# Patient Record
Sex: FEMALE | Race: WHITE | NOT HISPANIC OR LATINO | Employment: OTHER | URBAN - METROPOLITAN AREA
[De-identification: names, ages, dates, MRNs, and addresses within clinical notes are randomized per-mention and may not be internally consistent; named-entity substitution may affect disease eponyms.]

---

## 2018-06-01 ENCOUNTER — RECORDS - HEALTHEAST (OUTPATIENT)
Dept: LAB | Facility: HOSPITAL | Age: 63
End: 2018-06-01

## 2018-06-01 LAB
ALBUMIN SERPL-MCNC: 3.7 G/DL (ref 3.5–5)
ALP SERPL-CCNC: 108 U/L (ref 45–120)
ALT SERPL W P-5'-P-CCNC: 19 U/L (ref 0–45)
ANION GAP SERPL CALCULATED.3IONS-SCNC: 12 MMOL/L (ref 5–18)
AST SERPL W P-5'-P-CCNC: 19 U/L (ref 0–40)
BILIRUB SERPL-MCNC: 0.3 MG/DL (ref 0–1)
BUN SERPL-MCNC: 21 MG/DL (ref 8–22)
CALCIUM SERPL-MCNC: 9.6 MG/DL (ref 8.5–10.5)
CHLORIDE BLD-SCNC: 104 MMOL/L (ref 98–107)
CO2 SERPL-SCNC: 24 MMOL/L (ref 22–31)
CREAT SERPL-MCNC: 0.74 MG/DL (ref 0.6–1.1)
GFR SERPL CREATININE-BSD FRML MDRD: >60 ML/MIN/1.73M2
GLUCOSE BLD-MCNC: 108 MG/DL (ref 70–125)
POTASSIUM BLD-SCNC: 4.3 MMOL/L (ref 3.5–5)
PROT SERPL-MCNC: 8.2 G/DL (ref 6–8)
SODIUM SERPL-SCNC: 140 MMOL/L (ref 136–145)
TSH SERPL DL<=0.005 MIU/L-ACNC: 2.07 UIU/ML (ref 0.3–5)
VIT B12 SERPL-MCNC: 963 PG/ML (ref 213–816)

## 2019-06-17 ENCOUNTER — RECORDS - HEALTHEAST (OUTPATIENT)
Dept: LAB | Facility: CLINIC | Age: 64
End: 2019-06-17

## 2019-06-17 LAB
ANION GAP SERPL CALCULATED.3IONS-SCNC: 9 MMOL/L (ref 5–18)
BUN SERPL-MCNC: 22 MG/DL (ref 8–22)
CALCIUM SERPL-MCNC: 9.7 MG/DL (ref 8.5–10.5)
CHLORIDE BLD-SCNC: 107 MMOL/L (ref 98–107)
CO2 SERPL-SCNC: 24 MMOL/L (ref 22–31)
CREAT SERPL-MCNC: 0.69 MG/DL (ref 0.6–1.1)
GFR SERPL CREATININE-BSD FRML MDRD: >60 ML/MIN/1.73M2
GLUCOSE BLD-MCNC: 92 MG/DL (ref 70–125)
POTASSIUM BLD-SCNC: 4 MMOL/L (ref 3.5–5)
SODIUM SERPL-SCNC: 140 MMOL/L (ref 136–145)

## 2019-07-12 ENCOUNTER — RECORDS - HEALTHEAST (OUTPATIENT)
Dept: LAB | Facility: CLINIC | Age: 64
End: 2019-07-12

## 2019-07-12 LAB
ALBUMIN SERPL-MCNC: 4 G/DL (ref 3.5–5)
ALP SERPL-CCNC: 102 U/L (ref 45–120)
ALT SERPL W P-5'-P-CCNC: 14 U/L (ref 0–45)
ANION GAP SERPL CALCULATED.3IONS-SCNC: 11 MMOL/L (ref 5–18)
AST SERPL W P-5'-P-CCNC: 19 U/L (ref 0–40)
BILIRUB SERPL-MCNC: 0.4 MG/DL (ref 0–1)
BUN SERPL-MCNC: 26 MG/DL (ref 8–22)
CALCIUM SERPL-MCNC: 9.9 MG/DL (ref 8.5–10.5)
CHLORIDE BLD-SCNC: 107 MMOL/L (ref 98–107)
CO2 SERPL-SCNC: 27 MMOL/L (ref 22–31)
CREAT SERPL-MCNC: 0.75 MG/DL (ref 0.6–1.1)
GFR SERPL CREATININE-BSD FRML MDRD: >60 ML/MIN/1.73M2
GLUCOSE BLD-MCNC: 113 MG/DL (ref 70–125)
POTASSIUM BLD-SCNC: 3.7 MMOL/L (ref 3.5–5)
PREALB SERPL-MCNC: 15.6 MG/DL (ref 19–38)
PROT SERPL-MCNC: 7.7 G/DL (ref 6–8)
SODIUM SERPL-SCNC: 145 MMOL/L (ref 136–145)
TSH SERPL DL<=0.005 MIU/L-ACNC: 2.76 UIU/ML (ref 0.3–5)
VIT B12 SERPL-MCNC: 1001 PG/ML (ref 213–816)

## 2019-07-15 LAB — VIT B1 PYROPHOSHATE BLD-SCNC: 114 NMOL/L (ref 70–180)

## 2019-08-07 ENCOUNTER — HOSPITAL ENCOUNTER (OUTPATIENT)
Dept: MAMMOGRAPHY | Facility: CLINIC | Age: 64
Discharge: HOME | End: 2019-08-07
Attending: FAMILY MEDICINE

## 2019-08-07 DIAGNOSIS — Z12.31 VISIT FOR SCREENING MAMMOGRAM: ICD-10-CM

## 2020-04-10 ENCOUNTER — RECORDS - HEALTHEAST (OUTPATIENT)
Dept: LAB | Facility: CLINIC | Age: 65
End: 2020-04-10

## 2020-04-11 LAB — BACTERIA SPEC CULT: NO GROWTH

## 2020-07-13 ENCOUNTER — OFFICE VISIT (OUTPATIENT)
Dept: NEUROLOGY | Facility: CLINIC | Age: 65
End: 2020-07-13
Payer: COMMERCIAL

## 2020-07-13 VITALS — WEIGHT: 190 LBS | HEIGHT: 58 IN | BODY MASS INDEX: 39.88 KG/M2

## 2020-07-13 DIAGNOSIS — G30.1 LATE ONSET ALZHEIMER'S DISEASE WITH BEHAVIORAL DISTURBANCE (H): Primary | ICD-10-CM

## 2020-07-13 DIAGNOSIS — F02.818 LATE ONSET ALZHEIMER'S DISEASE WITH BEHAVIORAL DISTURBANCE (H): Primary | ICD-10-CM

## 2020-07-13 PROBLEM — F03.90 DEMENTIA (H): Status: ACTIVE | Noted: 2020-07-13

## 2020-07-13 PROBLEM — F34.1 DYSTHYMIA: Status: ACTIVE | Noted: 2020-07-13

## 2020-07-13 PROBLEM — R63.0 LOSS OF APPETITE: Status: ACTIVE | Noted: 2020-07-13

## 2020-07-13 PROBLEM — K21.9 GASTRO-ESOPHAGEAL REFLUX DISEASE WITHOUT ESOPHAGITIS: Status: ACTIVE | Noted: 2020-07-13

## 2020-07-13 PROBLEM — R35.0 INCREASED FREQUENCY OF URINATION: Status: ACTIVE | Noted: 2020-07-13

## 2020-07-13 PROBLEM — E66.01 MORBID OBESITY (H): Status: ACTIVE | Noted: 2020-07-13

## 2020-07-13 PROBLEM — R07.2 PRECORDIAL PAIN: Status: ACTIVE | Noted: 2020-07-13

## 2020-07-13 PROBLEM — M17.9 OSTEOARTHRITIS OF KNEE: Status: ACTIVE | Noted: 2020-07-13

## 2020-07-13 PROBLEM — Z79.1 LONG TERM (CURRENT) USE OF NON-STEROIDAL ANTI-INFLAMMATORIES (NSAID): Status: ACTIVE | Noted: 2020-07-13

## 2020-07-13 PROBLEM — G31.84 MILD COGNITIVE IMPAIRMENT: Status: ACTIVE | Noted: 2020-07-13

## 2020-07-13 PROBLEM — E78.5 HYPERLIPIDEMIA: Status: ACTIVE | Noted: 2020-07-13

## 2020-07-13 PROBLEM — Z79.82 LONG TERM (CURRENT) USE OF ASPIRIN: Status: ACTIVE | Noted: 2020-07-13

## 2020-07-13 PROBLEM — M46.1: Status: ACTIVE | Noted: 2020-07-13

## 2020-07-13 PROBLEM — N32.81 OVERACTIVE BLADDER: Status: ACTIVE | Noted: 2020-07-13

## 2020-07-13 PROBLEM — G47.01 INSOMNIA DUE TO MEDICAL CONDITION: Status: ACTIVE | Noted: 2020-07-13

## 2020-07-13 PROCEDURE — 99213 OFFICE O/P EST LOW 20 MIN: CPT | Mod: 95 | Performed by: PSYCHIATRY & NEUROLOGY

## 2020-07-13 RX ORDER — MEMANTINE HYDROCHLORIDE 10 MG/1
1 TABLET ORAL 2 TIMES DAILY
COMMUNITY
Start: 2020-01-12 | End: 2020-11-16

## 2020-07-13 RX ORDER — OMEGA-3/DHA/EPA/FISH OIL 60 MG-90MG
1 CAPSULE ORAL DAILY
COMMUNITY

## 2020-07-13 RX ORDER — QUETIAPINE FUMARATE 25 MG/1
1 TABLET, FILM COATED ORAL DAILY
COMMUNITY
Start: 2020-01-12 | End: 2020-07-13

## 2020-07-13 RX ORDER — DONEPEZIL HYDROCHLORIDE 10 MG/1
1 TABLET, FILM COATED ORAL AT BEDTIME
COMMUNITY
Start: 2020-01-12 | End: 2020-11-16

## 2020-07-13 RX ORDER — QUETIAPINE FUMARATE 25 MG/1
25 TABLET, FILM COATED ORAL 2 TIMES DAILY
Qty: 60 TABLET | Refills: 4 | Status: SHIPPED | OUTPATIENT
Start: 2020-07-13 | End: 2020-10-30

## 2020-07-13 RX ORDER — CELECOXIB 200 MG/1
1 CAPSULE ORAL 2 TIMES DAILY
COMMUNITY
Start: 2020-01-09

## 2020-07-13 RX ORDER — MIRTAZAPINE 30 MG/1
1 TABLET, FILM COATED ORAL AT BEDTIME
COMMUNITY
Start: 2020-01-12 | End: 2021-03-05

## 2020-07-13 RX ORDER — ASCORBIC ACID 500 MG
1 TABLET ORAL DAILY
COMMUNITY

## 2020-07-13 RX ORDER — CALCIUM CARBONATE/VITAMIN D3 500-10/5ML
1 LIQUID (ML) ORAL DAILY
COMMUNITY

## 2020-07-13 RX ORDER — ASPIRIN 81 MG/1
81 TABLET ORAL DAILY
COMMUNITY

## 2020-07-13 ASSESSMENT — MIFFLIN-ST. JEOR: SCORE: 1286.58

## 2020-07-13 NOTE — PROGRESS NOTES
[facility] Neurology  [place]    Jasmyne Madison MRN# [id number]   Age: 65 year old YOB: 1955               Assessment and Plan:   Assessment:   Dementia consistent with Alzheimer disease        Plan:     For now we will increase the quetiapine to twice a day, once in the afternoon and once before bed.  We will see if it makes a difference in terms of managing some agitation and defiant behavior.  I again encouraged the patient's  to take advantage of any assistance available.  I suggested a call the [facility]  regarding home health that is available.  With the current coronavirus pandemic, it is hard to pressure family into placing the patient in long-term care where virus transmission tends to occur rarely.    We will plan to meet again in 3 or 4 months to see how things are going.  We will continue the Aricept and Namenda for now.             Chief Complaint/HPI:     I saw this patient and her  for a video follow-up visit today.  I last saw them about 6 months ago.  Recall continues to decline.  The patient's  reports that she can typically remember something for about 30 seconds.  She repeats a lot of questions now.  She often asks where they are going today.  In fact, he purchased a remote control outlet for the garage , as the patient will sometimes open the garage door and wait in the car, anticipating going somewhere (when there are no plans to go anywhere) and she might leave the garage door open even after coming back inside.  After looking at the medication label, he has been giving her the Seroquel around bedtime.  She is up and down a little bit less until she finally goes to sleep around 10 or 1030 but clearly the Seroquel has not helped the defiant behaviors earlier in the day.  Her  is able to get out for 9 holes of golf once a week, their son comes to stay with the patient at that time, otherwise her  really provides 24-hour  care.  She continues to be defiant about personal hygiene.  They have a computer photo frame and she can look at photos sent by family members.  She does remember her own mother and close family members, but recently could not recognize her brother-in-law (whom she has known for 40 years).            Past Medical History:    has a past medical history of Dementia (H), GERD (gastroesophageal reflux disease), and Hyperlipidemia.          Past Surgical History:    has a past surgical history that includes GYN surgery and Abdomen surgery.          Social History:     Social History     Tobacco Use     Smoking status: Never Smoker     Smokeless tobacco: Never Used   Substance Use Topics     Alcohol use: Not Currently             Family History:     Family History   Problem Relation Age of Onset     Depression Mother      Suicidality Mother      Diabetes Father      Multiple Sclerosis Father      Myocardial Infarction Father                 Allergies:   No Known Allergies          Medications:     Current Outpatient Medications:      aspirin 81 MG EC tablet, Take 81 mg by mouth daily, Disp: , Rfl:      celecoxib (CELEBREX) 200 MG capsule, Take 1 capsule by mouth 2 times daily, Disp: , Rfl:      cholecalciferol (VITAMIN D3) 25 mcg (1000 units) capsule, Take 2 capsules by mouth daily, Disp: , Rfl:      docusate sodium (COLACE) 50 MG capsule, Take 50 mg by mouth 2 times daily as needed for constipation, Disp: , Rfl:      donepezil (ARICEPT) 10 MG tablet, Take 1 tablet by mouth At Bedtime, Disp: , Rfl:      magnesium oxide 400 MG CAPS, Take 1 capsule by mouth daily, Disp: , Rfl:      memantine (NAMENDA) 10 MG tablet, Take 1 tablet by mouth 2 times daily, Disp: , Rfl:      mirtazapine (REMERON) 30 MG tablet, Take 1 tablet by mouth At Bedtime, Disp: , Rfl:      Multiple Vitamins-Minerals (MULTIVITAMIN ADULT PO), Take 1 tablet by mouth daily, Disp: , Rfl:      Omega-3 Fatty Acids (FISH OIL) 500 MG CAPS, Take 1 capsule by mouth  "daily, Disp: , Rfl:      QUEtiapine (SEROQUEL) 25 MG tablet, Take 1 tablet by mouth daily 0.5 tab was not working so they are using the full tab PO Daily, Disp: , Rfl:      vitamin C (ASCORBIC ACID) 500 MG tablet, Take 1 tablet by mouth daily, Disp: , Rfl:            Review of Systems:   No difficulty breathing or swallowing, no balance difficulties            Physical Exam:   She is awake and alert, she does not participate in cognitive testing  Conversation is clear and coherent but limited.       The patient has been notified of following:     \"This video visit will be conducted via a call between you and your physician/provider. We have found that certain health care needs can be provided without the need for an in-person physical exam.  This service lets us provide the care you need with a video conversation.  If a prescription is necessary we can send it directly to your pharmacy.  If lab work is needed we can place an order for that and you can then stop by our lab to have the test done at a later time.    Video visits are billed at different rates depending on your insurance coverage.  Please reach out to your insurance provider with any questions.    If during the course of the call the physician/provider feels a video visit is not appropriate, you will not be charged for this service.\"    Patient has given verbal consent for Video visit? Yes      Video-Visit Details    Type of service:  Video Visit    Video Start Time: 10:47 AM  Video End Time: 11:08 AM    Originating Location (pt. Location): Home  Distant Location (provider location):  [facility] NEUROLOGY [place]   Platform used for Video Visit: Shaun Albrecht MD         "

## 2020-07-13 NOTE — LETTER
7/13/2020         RE: Jasmyne Madison  [address] [facility] [address]        Dear Colleague,    Thank you for referring your patient, Jasmyne Madison, to the [facility] NEUROLOGY [place]. Please see a copy of my visit note below.    [facility] Neurology  [place]    Jasmyne Madison MRN# [id number]   Age: 65 year old YOB: 1955               Assessment and Plan:   Assessment:   Dementia consistent with Alzheimer disease        Plan:     For now we will increase the quetiapine to twice a day, once in the afternoon and once before bed.  We will see if it makes a difference in terms of managing some agitation and defiant behavior.  I again encouraged the patient's  to take advantage of any assistance available.  I suggested a call the [facility]  regarding home health that is available.  With the current coronavirus pandemic, it is hard to pressure family into placing the patient in long-term care where virus transmission tends to occur rarely.    We will plan to meet again in 3 or 4 months to see how things are going.  We will continue the Aricept and Namenda for now.             Chief Complaint/HPI:     I saw this patient and her  for a video follow-up visit today.  I last saw them about 6 months ago.  Recall continues to decline.  The patient's  reports that she can typically remember something for about 30 seconds.  She repeats a lot of questions now.  She often asks where they are going today.  In fact, he purchased a remote control outlet for the garage , as the patient will sometimes open the garage door and wait in the car, anticipating going somewhere (when there are no plans to go anywhere) and she might leave the garage door open even after coming back inside.  After looking at the medication label, he has been giving her the Seroquel around bedtime.  She is up and down a little bit less until she finally goes to sleep around 10 or 1030 but  clearly the Seroquel has not helped the defiant behaviors earlier in the day.  Her  is able to get out for 9 holes of golf once a week, their son comes to stay with the patient at that time, otherwise her  really provides 24-hour care.  She continues to be defiant about personal hygiene.  They have a computer photo frame and she can look at photos sent by family members.  She does remember her own mother and close family members, but recently could not recognize her brother-in-law (whom she has known for 40 years).            Past Medical History:    has a past medical history of Dementia (H), GERD (gastroesophageal reflux disease), and Hyperlipidemia.          Past Surgical History:    has a past surgical history that includes GYN surgery and Abdomen surgery.          Social History:     Social History     Tobacco Use     Smoking status: Never Smoker     Smokeless tobacco: Never Used   Substance Use Topics     Alcohol use: Not Currently             Family History:     Family History   Problem Relation Age of Onset     Depression Mother      Suicidality Mother      Diabetes Father      Multiple Sclerosis Father      Myocardial Infarction Father                 Allergies:   No Known Allergies          Medications:     Current Outpatient Medications:      aspirin 81 MG EC tablet, Take 81 mg by mouth daily, Disp: , Rfl:      celecoxib (CELEBREX) 200 MG capsule, Take 1 capsule by mouth 2 times daily, Disp: , Rfl:      cholecalciferol (VITAMIN D3) 25 mcg (1000 units) capsule, Take 2 capsules by mouth daily, Disp: , Rfl:      docusate sodium (COLACE) 50 MG capsule, Take 50 mg by mouth 2 times daily as needed for constipation, Disp: , Rfl:      donepezil (ARICEPT) 10 MG tablet, Take 1 tablet by mouth At Bedtime, Disp: , Rfl:      magnesium oxide 400 MG CAPS, Take 1 capsule by mouth daily, Disp: , Rfl:      memantine (NAMENDA) 10 MG tablet, Take 1 tablet by mouth 2 times daily, Disp: , Rfl:      mirtazapine  "(REMERON) 30 MG tablet, Take 1 tablet by mouth At Bedtime, Disp: , Rfl:      Multiple Vitamins-Minerals (MULTIVITAMIN ADULT PO), Take 1 tablet by mouth daily, Disp: , Rfl:      Omega-3 Fatty Acids (FISH OIL) 500 MG CAPS, Take 1 capsule by mouth daily, Disp: , Rfl:      QUEtiapine (SEROQUEL) 25 MG tablet, Take 1 tablet by mouth daily 0.5 tab was not working so they are using the full tab PO Daily, Disp: , Rfl:      vitamin C (ASCORBIC ACID) 500 MG tablet, Take 1 tablet by mouth daily, Disp: , Rfl:            Review of Systems:   No difficulty breathing or swallowing, no balance difficulties            Physical Exam:   She is awake and alert, she does not participate in cognitive testing  Conversation is clear and coherent but limited.       The patient has been notified of following:     \"This video visit will be conducted via a call between you and your physician/provider. We have found that certain health care needs can be provided without the need for an in-person physical exam.  This service lets us provide the care you need with a video conversation.  If a prescription is necessary we can send it directly to your pharmacy.  If lab work is needed we can place an order for that and you can then stop by our lab to have the test done at a later time.    Video visits are billed at different rates depending on your insurance coverage.  Please reach out to your insurance provider with any questions.    If during the course of the call the physician/provider feels a video visit is not appropriate, you will not be charged for this service.\"    Patient has given verbal consent for Video visit? Yes      Video-Visit Details    Type of service:  Video Visit    Video Start Time: 10:47 AM  Video End Time: 11:08 AM    Originating Location (pt. Location): Home  Distant Location (provider location):  [facility] NEUROLOGY [place]   Platform used for Video Visit: Shaun Albrecht MD         Again, thank " you for allowing me to participate in the care of your patient.        Sincerely,        Demond Albrecht MD

## 2020-07-13 NOTE — PATIENT INSTRUCTIONS
Patient Education     Alzheimer Disease  Alzheimer disease is a brain illness that can happen usually in older adults, but it can also happen as early as age 40. It is the most common cause of dementia. It is a progressive disease. This means it gets worse over time.  What is Alzheimer disease?  Alzheimer disease causes a series of changes to nerves of the brain. Some nerves form into clumps and tangles, and lose some of their connections to other nerves.  Healthcare providers don t fully understand what causes Alzheimer disease. But they think these may be some of the causes:    Age and family history    Certain genes    Abnormal protein deposits in the brain    Environmental factors    Problems with a person s immune system    Possibly infections  Symptoms of Alzheimer disease  The disease causes changes in behavior and thinking known as dementia. The symptoms include:    Memory loss    Confusion    Restlessness    Personality and behavior changes    Problems with judgment    Problems communicating with others    Inability to follow directions    Lack of emotion  Diagnosing Alzheimer disease  No single test is able to diagnose Alzheimer disease. Instead healthcare providers use a series of tests to rule out other health conditions. The tests may include:    A complete medical history. This may include questions about overall health and past health problems. The healthcare provider may ask how well the person can do daily tasks. The healthcare provider may ask family or close friends about any changes in behavior or personality.    Mental status test. This is a test of memory, problem solving, attention, counting, and language.     Standard medical tests. These may include blood and urine tests to find possible causes for the problem.    Brain imaging tests.  CT, MRI, or positron emission tomography (PET) may be used to rule out other causes of the problem.  Treating Alzheimer disease  Alzheimer disease has no  cure. Instead healthcare providers can help ease some symptoms. This can make a person with Alzheimer more comfortable. Treatment can also make it easier for their caregivers to take care of them.  Some medicines may help slow the decline of a person s memory, thinking, and language skills. They may help with problems of behavior, such as aggression. They can lessen hallucinations and delusions. These medicines can work for some but not all people. And they may help for only a limited time. Medicines include:    Cholinesterase inhibitors    Donepezil    Galantamine    Rivastigmine    Memantine  In some cases, behavior problems can be caused by medicine side effects. Talk with the person s healthcare provider about all medicines he or she is taking.  Keeping healthy  For a person with Alzheimer, it s important to stay healthy. Good nutrition and physical and social activity are vital. A calm and well-structured environment will help. Make sure to keep up with healthcare appointments and managing other health conditions, such as diabetes. Some people benefit from having a nutritionist help to prevent weight loss.    Caring for someone with Alzheimer  A person with Alzheimer will need more caregiving over time. Talk with your healthcare provider about caregiving resources.   Date Last Reviewed: 4/1/2018 2000-2019 [employer]. [address] [place] [address], [place] [address]. All rights reserved. This information is not intended as a substitute for professional medical care. Always follow your healthcare professional's instructions.

## 2020-07-13 NOTE — NURSING NOTE
Chief Complaint   Patient presents with     Follow Up     dementia follow up- states her recall ability has declined drastically with lots of repeating of questions during the day. States she is very stubborn and lots of arguing when discussing bathing/grooming      Video Visit Computer [email or web address]  Charlie Hoffman on 7/13/2020 at 9:41 AM

## 2020-10-30 DIAGNOSIS — G30.1 LATE ONSET ALZHEIMER'S DISEASE WITH BEHAVIORAL DISTURBANCE (H): ICD-10-CM

## 2020-10-30 DIAGNOSIS — F02.818 LATE ONSET ALZHEIMER'S DISEASE WITH BEHAVIORAL DISTURBANCE (H): ICD-10-CM

## 2020-10-30 RX ORDER — QUETIAPINE FUMARATE 25 MG/1
TABLET, FILM COATED ORAL
Qty: 180 TABLET | Refills: 1 | Status: SHIPPED | OUTPATIENT
Start: 2020-10-30 | End: 2020-11-16

## 2020-11-16 ENCOUNTER — VIRTUAL VISIT (OUTPATIENT)
Dept: NEUROLOGY | Facility: CLINIC | Age: 65
End: 2020-11-16
Payer: COMMERCIAL

## 2020-11-16 VITALS — BODY MASS INDEX: 39.88 KG/M2 | HEIGHT: 58 IN | WEIGHT: 190 LBS

## 2020-11-16 DIAGNOSIS — F02.818 LATE ONSET ALZHEIMER'S DISEASE WITH BEHAVIORAL DISTURBANCE (H): ICD-10-CM

## 2020-11-16 DIAGNOSIS — G30.1 LATE ONSET ALZHEIMER'S DISEASE WITHOUT BEHAVIORAL DISTURBANCE (H): Primary | ICD-10-CM

## 2020-11-16 DIAGNOSIS — G30.1 LATE ONSET ALZHEIMER'S DISEASE WITH BEHAVIORAL DISTURBANCE (H): ICD-10-CM

## 2020-11-16 DIAGNOSIS — F02.80 LATE ONSET ALZHEIMER'S DISEASE WITHOUT BEHAVIORAL DISTURBANCE (H): Primary | ICD-10-CM

## 2020-11-16 PROCEDURE — 99214 OFFICE O/P EST MOD 30 MIN: CPT | Mod: 95 | Performed by: PSYCHIATRY & NEUROLOGY

## 2020-11-16 RX ORDER — MEMANTINE HYDROCHLORIDE 10 MG/1
10 TABLET ORAL 2 TIMES DAILY
Qty: 180 TABLET | Refills: 3 | Status: SHIPPED | OUTPATIENT
Start: 2020-11-16 | End: 2021-05-17

## 2020-11-16 RX ORDER — QUETIAPINE FUMARATE 25 MG/1
25 TABLET, FILM COATED ORAL 2 TIMES DAILY
Qty: 180 TABLET | Refills: 3 | Status: SHIPPED | OUTPATIENT
Start: 2020-11-16 | End: 2021-05-17

## 2020-11-16 ASSESSMENT — MIFFLIN-ST. JEOR: SCORE: 1286.58

## 2020-11-16 NOTE — PATIENT INSTRUCTIONS
Patient Education     Dementia and Caregiver Support   Dementia is a long-term (chronic) condition that affects the brain It causes loss of memory and thinking functions. Some forms of dementia are from degeneration of the brain. They get worse with time. Other forms stay the same for long periods of time. A person with dementia may have trouble recognizing familiar people and places, or knowing what day it is. The person s memory, judgment, and decision-making may also be affected. In severe cases, the person may not respond when someone talks to him or her.  The most common form of dementia is Alzheimer disease (AD). Doctors don t fully understand what causes AD. It has no cure. But medicines can treat some of the symptoms.  Some of the less common causes for dementia are curable. So it s important to have a complete health assessment to look for conditions that can be treated.  Home care  These tips can help you care for a person with AD at home:    A responsible person must be with the person who has advanced AD at all times.  He or she should not be left alone or unsupervised.    In the case of advanced AD, keep all medicines in a secure place. They should be under the caregiver s control. A person with advanced AD should not be allowed to take his or her own medicines. This needs to be supervised by the caregiver.  Here are ways to help a person with dementia:  Activities  Keep to a daily routine. Changes in routine can cause stress for someone with dementia. Make a schedule for common daily tasks. These include bathing, dressing, taking medicines, eating meals, going for walks, and going to bed.  Communication  When talking to a person with dementia, talk slowly and clearly. Use a gentle tone of voice. Choose short, simple words and sentences. Ask one question at a time. Don t interrupt, criticize, or argue. Be calm and supportive. Use friendly facial expressions. Use pointing and touching to help  communicate. If the person has a loss of long-term memory, don t ask questions about past events. Instead, talk about what is happening now.  Behavioral tips  Use lists, signs, family photos, clocks, and calendars as memory aids. Label cabinets and drawers. Try to distract, not confront, the person. When he or she becomes frustrated or upset, direct the person s attention to eating or some other interesting activity. Windows can help the person know if it's night or day and what season it is.  Medical-legal tips  Talk with your doctor or  about getting a power of  for healthcare and for financial decisions. It's best to do this while the person can still sign legal documents and make his or her own legal decisions. Otherwise, you'll need a court order.  Support for the caregiver  As the caregiver, you will need a lot of support for yourself. Caring for a person with dementia is a full-time job. It can drain your emotions and lead to frustration and anger toward the one you love. It is common to have feelings of grief over losing the relationship that you once had. As a caregiver to someone with dementia, you are at higher risk for depression, anxiety and stress.  Here are some tips to help you cope with being a caregiver:    Learn about dementia and Alzheimer disease so you know what to expect.    Find out about the resources in your community, including adult day-care programs. Ask your healthcare provider for a referral to a , if needed.    Take care of yourself with a healthy diet, exercise, and plenty of rest.    Ask for help. Share some of the caretaking duties with family and friends.    Make personal time for yourself. This is essential! Consider hiring an in-home sitter or home health aide.    Seek counseling or join a caregiver s support group. Don't isolate yourself or try to cope with this alone. In a support group, you can learn from others in a similar situation.    Visit the  Alzheimer s Association website (www.alz.org) for more information.  Follow-up care  Follow up with the person s healthcare provider, or as advised.  When to seek medical advice  Call your healthcare provider right away if any of these occur:    Frequent falls    The person refuses to eat or drink    Headache or nausea that gets worse, or repeated vomiting after a fall    Unexplained fever of 100.4  F (38.0  C) or higher, or as advised  Call 911  Call 911 if any of these occur:    Slurred speech, or trouble speaking, walking, or seeing    Fainting spell or dizziness    Seizure symptoms. These include staring spells, lip-smacking, twitching, or sudden changes in mental status.    Violent behavior (call police) or behavior becomes too difficult to manage at home    Increased drowsiness, or failure to respond normally  Man last reviewed this educational content on 9/1/2019 2000-2020 [employer]. [address] [place] [address], [place] [address]. All rights reserved. This information is not intended as a substitute for professional medical care. Always follow your healthcare professional's instructions.

## 2020-11-16 NOTE — LETTER
11/16/2020         RE: Jasmyne Madison  [address] [facility] [address]        Dear Colleague,    Thank you for referring your patient, Jasmyne Madison, to the [facility] NEUROLOGY CLINIC [place]. Please see a copy of my visit note below.    [facility] Neurology  [place]    Jasmyne Madison MRN# [id number]   Age: 65 year old YOB: 1955               Assessment and Plan:   Assessment:   Dementia consistent with Alzheimer disease        Plan:     For now we will stop the donepezil (which can cause appetite suppression and agitation)    We will change the Seroquel dosing to 1/2 tablet in the morning and a full tablet in the evening.  The prescription is for 2 tablets/day, so he has another half tablet available to give Jasmyne as needed.    I recommended a regular sleep schedule and some strategies for redirection.  We will plan to meet again in 6 months, sooner if need be.             Chief Complaint/HPI:     I spoke to the patient and her  today for video follow-up visit.  We last met in July of this year.  At that time the patient was showing some more defiant behaviors.  We added a daytime dose of the quetiapine which did help nicely but about a month ago she began eating less and was less active.  She did not want to get up, did  not want to go with her  when he was leaving the house (which she normally does), she was not wanting to eat.  A couple weeks ago her  stopped giving her the daytime dose of Seroquel and now she is getting back to normal.  Appetite is better but not quite back to normal.  She is having some nights where she is not sleeping well.  She might go to bed early and then be awake at midnight or 1 AM.  Last night, she got up at 1:30 AM and went to watch TV and it was fairly loud.  After a while her  was able to convince her to come back to bed, but then she was awake at 530 and then got up at 6.            Past Medical History:    has a past  medical history of Dementia (H), GERD (gastroesophageal reflux disease), and Hyperlipidemia.          Past Surgical History:    has a past surgical history that includes GYN surgery and Abdomen surgery.          Social History:     Social History     Tobacco Use     Smoking status: Never Smoker     Smokeless tobacco: Never Used   Substance Use Topics     Alcohol use: Not Currently             Family History:     Family History   Problem Relation Age of Onset     Depression Mother      Suicidality Mother      Diabetes Father      Multiple Sclerosis Father      Myocardial Infarction Father                 Allergies:   No Known Allergies          Medications:     Current Outpatient Medications:      aspirin 81 MG EC tablet, Take 81 mg by mouth daily, Disp: , Rfl:      celecoxib (CELEBREX) 200 MG capsule, Take 1 capsule by mouth 2 times daily, Disp: , Rfl:      cholecalciferol (VITAMIN D3) 25 mcg (1000 units) capsule, Take 2 capsules by mouth daily, Disp: , Rfl:      docusate sodium (COLACE) 50 MG capsule, Take 50 mg by mouth 2 times daily as needed for constipation, Disp: , Rfl:      magnesium oxide 400 MG CAPS, Take 1 capsule by mouth daily, Disp: , Rfl:      memantine (NAMENDA) 10 MG tablet, Take 1 tablet (10 mg) by mouth 2 times daily, Disp: 180 tablet, Rfl: 3     mirtazapine (REMERON) 30 MG tablet, Take 1 tablet by mouth At Bedtime, Disp: , Rfl:      Multiple Vitamins-Minerals (MULTIVITAMIN ADULT PO), Take 1 tablet by mouth daily, Disp: , Rfl:      Omega-3 Fatty Acids (FISH OIL) 500 MG CAPS, Take 1 capsule by mouth daily, Disp: , Rfl:      QUEtiapine (SEROQUEL) 25 MG tablet, Take 1 tablet (25 mg) by mouth 2 times daily, Disp: 180 tablet, Rfl: 3     vitamin C (ASCORBIC ACID) 500 MG tablet, Take 1 tablet by mouth daily, Disp: , Rfl:            Review of Systems:   No difficulty breathing or swallowing            Physical Exam:   Awake and alert with no aphasia no dysarthria  Speech is clear and coherent  Recall is  "very poor, she is often repeating herself.     The patient has been notified of following:     \"This video visit will be conducted via a call between you and your physician/provider. We have found that certain health care needs can be provided without the need for an in-person physical exam.  This service lets us provide the care you need with a video conversation.  If a prescription is necessary we can send it directly to your pharmacy.  If lab work is needed we can place an order for that and you can then stop by our lab to have the test done at a later time.    Video visits are billed at different rates depending on your insurance coverage.  Please reach out to your insurance provider with any questions.    If during the course of the call the physician/provider feels a video visit is not appropriate, you will not be charged for this service.\"    Patient has given verbal consent for Video visit? Yes      Video-Visit Details    Type of service:  Video Visit    Video Start Time: 10:36 AM  Video End Time: 10:52 AM    Originating Location (pt. Location): Home  Distant Location (provider location):  [facility]   Platform used for Video Visit: [facility]       Demond Albrecht MD             Again, thank you for allowing me to participate in the care of your patient.        Sincerely,        Demond Albrecht MD    "

## 2020-11-16 NOTE — NURSING NOTE
Chief Complaint   Patient presents with     Follow Up     4 month follow up-would like to discuss medication and patient waking up during the night to watch tv      Video Visit Rosalba Hoffman CMA on 11/16/2020 at 10:19 AM

## 2020-11-16 NOTE — PROGRESS NOTES
[facility] Neurology  [place]    Jasmyne Madison MRN# [id number]   Age: 65 year old YOB: 1955               Assessment and Plan:   Assessment:   Dementia consistent with Alzheimer disease        Plan:     For now we will stop the donepezil (which can cause appetite suppression and agitation)    We will change the Seroquel dosing to 1/2 tablet in the morning and a full tablet in the evening.  The prescription is for 2 tablets/day, so he has another half tablet available to give Jasmyne as needed.    I recommended a regular sleep schedule and some strategies for redirection.  We will plan to meet again in 6 months, sooner if need be.             Chief Complaint/HPI:     I spoke to the patient and her  today for video follow-up visit.  We last met in July of this year.  At that time the patient was showing some more defiant behaviors.  We added a daytime dose of the quetiapine which did help nicely but about a month ago she began eating less and was less active.  She did not want to get up, did  not want to go with her  when he was leaving the house (which she normally does), she was not wanting to eat.  A couple weeks ago her  stopped giving her the daytime dose of Seroquel and now she is getting back to normal.  Appetite is better but not quite back to normal.  She is having some nights where she is not sleeping well.  She might go to bed early and then be awake at midnight or 1 AM.  Last night, she got up at 1:30 AM and went to watch TV and it was fairly loud.  After a while her  was able to convince her to come back to bed, but then she was awake at 530 and then got up at 6.            Past Medical History:    has a past medical history of Dementia (H), GERD (gastroesophageal reflux disease), and Hyperlipidemia.          Past Surgical History:    has a past surgical history that includes GYN surgery and Abdomen surgery.          Social History:     Social History  "    Tobacco Use     Smoking status: Never Smoker     Smokeless tobacco: Never Used   Substance Use Topics     Alcohol use: Not Currently             Family History:     Family History   Problem Relation Age of Onset     Depression Mother      Suicidality Mother      Diabetes Father      Multiple Sclerosis Father      Myocardial Infarction Father                 Allergies:   No Known Allergies          Medications:     Current Outpatient Medications:      aspirin 81 MG EC tablet, Take 81 mg by mouth daily, Disp: , Rfl:      celecoxib (CELEBREX) 200 MG capsule, Take 1 capsule by mouth 2 times daily, Disp: , Rfl:      cholecalciferol (VITAMIN D3) 25 mcg (1000 units) capsule, Take 2 capsules by mouth daily, Disp: , Rfl:      docusate sodium (COLACE) 50 MG capsule, Take 50 mg by mouth 2 times daily as needed for constipation, Disp: , Rfl:      magnesium oxide 400 MG CAPS, Take 1 capsule by mouth daily, Disp: , Rfl:      memantine (NAMENDA) 10 MG tablet, Take 1 tablet (10 mg) by mouth 2 times daily, Disp: 180 tablet, Rfl: 3     mirtazapine (REMERON) 30 MG tablet, Take 1 tablet by mouth At Bedtime, Disp: , Rfl:      Multiple Vitamins-Minerals (MULTIVITAMIN ADULT PO), Take 1 tablet by mouth daily, Disp: , Rfl:      Omega-3 Fatty Acids (FISH OIL) 500 MG CAPS, Take 1 capsule by mouth daily, Disp: , Rfl:      QUEtiapine (SEROQUEL) 25 MG tablet, Take 1 tablet (25 mg) by mouth 2 times daily, Disp: 180 tablet, Rfl: 3     vitamin C (ASCORBIC ACID) 500 MG tablet, Take 1 tablet by mouth daily, Disp: , Rfl:            Review of Systems:   No difficulty breathing or swallowing            Physical Exam:   Awake and alert with no aphasia no dysarthria  Speech is clear and coherent  Recall is very poor, she is often repeating herself.     The patient has been notified of following:     \"This video visit will be conducted via a call between you and your physician/provider. We have found that certain health care needs can be provided " "without the need for an in-person physical exam.  This service lets us provide the care you need with a video conversation.  If a prescription is necessary we can send it directly to your pharmacy.  If lab work is needed we can place an order for that and you can then stop by our lab to have the test done at a later time.    Video visits are billed at different rates depending on your insurance coverage.  Please reach out to your insurance provider with any questions.    If during the course of the call the physician/provider feels a video visit is not appropriate, you will not be charged for this service.\"    Patient has given verbal consent for Video visit? Yes      Video-Visit Details    Type of service:  Video Visit    Video Start Time: 10:36 AM  Video End Time: 10:52 AM    Originating Location (pt. Location): Home  Distant Location (provider location):  [facility] NEUROLOGY [place]   Platform used for Video Visit: Shaun Albrecht MD         "

## 2021-01-04 ENCOUNTER — HEALTH MAINTENANCE LETTER (OUTPATIENT)
Age: 66
End: 2021-01-04

## 2021-01-04 ENCOUNTER — TELEPHONE (OUTPATIENT)
Dept: NEUROLOGY | Facility: CLINIC | Age: 66
End: 2021-01-04

## 2021-01-04 DIAGNOSIS — F02.80 LATE ONSET ALZHEIMER'S DISEASE WITHOUT BEHAVIORAL DISTURBANCE (H): Primary | ICD-10-CM

## 2021-01-04 DIAGNOSIS — G30.1 LATE ONSET ALZHEIMER'S DISEASE WITHOUT BEHAVIORAL DISTURBANCE (H): Primary | ICD-10-CM

## 2021-01-04 NOTE — TELEPHONE ENCOUNTER
Received a request for refill through [facility] which was denied for the Donepezil rx per 11/16/2020 office notes. Patient called pharmacy very upset. Pharmacy called office and asked that we touch base and advise.   I called Jeanmarie and he states that he had tried to have Jasmyne off the donepezil for about 1 week after the office visit however there was no improvement so he had her re-start it around thanksgiving. He made the adjustment as suggested to the Seroquel dosing to 1/2 tablet in the morning and a full tablet in the evening  And reports that Jasmyne has had return of appetite and has had major improvements. He is wanting a refill sent if advised for 10mg PO HS of Donepezil to be sent when Dr. Albrecht returns.   Please advise   Charlie Hoffman CMA on 1/4/2021 at 3:38 PM

## 2021-01-05 RX ORDER — DONEPEZIL HYDROCHLORIDE 10 MG/1
10 TABLET, FILM COATED ORAL AT BEDTIME
Qty: 90 TABLET | Refills: 3 | Status: SHIPPED | OUTPATIENT
Start: 2021-01-05 | End: 2021-05-17

## 2021-03-05 DIAGNOSIS — G30.1 LATE ONSET ALZHEIMER'S DISEASE WITH BEHAVIORAL DISTURBANCE (H): Primary | ICD-10-CM

## 2021-03-05 DIAGNOSIS — F02.818 LATE ONSET ALZHEIMER'S DISEASE WITH BEHAVIORAL DISTURBANCE (H): Primary | ICD-10-CM

## 2021-03-05 RX ORDER — MIRTAZAPINE 30 MG/1
30 TABLET, FILM COATED ORAL AT BEDTIME
Qty: 90 TABLET | Refills: 3 | Status: SHIPPED | OUTPATIENT
Start: 2021-03-05 | End: 2021-05-17

## 2021-05-17 ENCOUNTER — OFFICE VISIT (OUTPATIENT)
Dept: NEUROLOGY | Facility: CLINIC | Age: 66
End: 2021-05-17
Payer: COMMERCIAL

## 2021-05-17 VITALS
SYSTOLIC BLOOD PRESSURE: 139 MMHG | HEART RATE: 71 BPM | WEIGHT: 226 LBS | DIASTOLIC BLOOD PRESSURE: 88 MMHG | HEIGHT: 58 IN | BODY MASS INDEX: 47.44 KG/M2

## 2021-05-17 DIAGNOSIS — G30.1 LATE ONSET ALZHEIMER'S DISEASE WITHOUT BEHAVIORAL DISTURBANCE (H): ICD-10-CM

## 2021-05-17 DIAGNOSIS — G30.1 LATE ONSET ALZHEIMER'S DISEASE WITH BEHAVIORAL DISTURBANCE (H): ICD-10-CM

## 2021-05-17 DIAGNOSIS — F02.818 LATE ONSET ALZHEIMER'S DISEASE WITH BEHAVIORAL DISTURBANCE (H): ICD-10-CM

## 2021-05-17 DIAGNOSIS — F02.80 LATE ONSET ALZHEIMER'S DISEASE WITHOUT BEHAVIORAL DISTURBANCE (H): ICD-10-CM

## 2021-05-17 PROCEDURE — 99214 OFFICE O/P EST MOD 30 MIN: CPT | Performed by: PSYCHIATRY & NEUROLOGY

## 2021-05-17 RX ORDER — QUETIAPINE FUMARATE 25 MG/1
25 TABLET, FILM COATED ORAL 2 TIMES DAILY
Qty: 180 TABLET | Refills: 3 | Status: SHIPPED | OUTPATIENT
Start: 2021-05-17 | End: 2022-07-11

## 2021-05-17 RX ORDER — RIVASTIGMINE 9.5 MG/24H
1 PATCH, EXTENDED RELEASE TRANSDERMAL DAILY
Qty: 90 PATCH | Refills: 3 | Status: SHIPPED | OUTPATIENT
Start: 2021-05-17

## 2021-05-17 RX ORDER — MEMANTINE HYDROCHLORIDE 10 MG/1
10 TABLET ORAL 2 TIMES DAILY
Qty: 180 TABLET | Refills: 3 | Status: SHIPPED | OUTPATIENT
Start: 2021-05-17 | End: 2022-05-27

## 2021-05-17 RX ORDER — MULTIVITAMIN/IRON/FOLIC ACID 18MG-0.4MG
TABLET ORAL
COMMUNITY
Start: 2019-07-10

## 2021-05-17 RX ORDER — DONEPEZIL HYDROCHLORIDE 10 MG/1
10 TABLET, FILM COATED ORAL AT BEDTIME
Qty: 90 TABLET | Refills: 3 | Status: SHIPPED | OUTPATIENT
Start: 2021-05-17

## 2021-05-17 RX ORDER — MIRTAZAPINE 30 MG/1
30 TABLET, FILM COATED ORAL AT BEDTIME
Qty: 90 TABLET | Refills: 3 | Status: SHIPPED | OUTPATIENT
Start: 2021-05-17 | End: 2022-05-23

## 2021-05-17 ASSESSMENT — MIFFLIN-ST. JEOR: SCORE: 1449.88

## 2021-05-17 NOTE — LETTER
5/17/2021         RE: Jasmyne Madison  [address] [facility] [address]        Dear Colleague,    Thank you for referring your patient, Jasmyne Madison, to the [facility] NEUROLOGY CLINIC [place]. Please see a copy of my visit note below.    [facility] Neurology  [place]    Jasmyne Madison MRN# [id number]   Age: 65 year old YOB: 1955               Assessment and Plan:   Assessment:   Dementia consistent with Alzheimer disease        Plan:     We will continue the memantine and quetiapine.  Her  asks about the Exelon patch which has worked well for a family member elsewhere.  We will switch her donepezil over to the rivastigmine patch to see if this improves behavior or focus and attention.  Again I mentioned a more supervised setting such as memory care unit.             Chief Complaint/HPI:     I saw Jasmyne and her  for follow-up today.  She continues to have significant cognitive difficulties.  She remains at home with her .  A son and a granddaughter check in at least once a week each and this gives her  some respite.  The patient is more compliant with her son and grand daughter, with her  she continues to be argumentative at times.  She will be up at night and will watch TV, this might happen 3 times a week or so.  Many times a day she will go to the attached garage to get in the car as though going somewhere.  Her  keeps the car locked and has a safety lock on the garage door so that she cannot go anywhere.  There is not a clear history of increased confusion or agitation at night.  She does resist bathing etc.            Past Medical History:    has a past medical history of Dementia (H), GERD (gastroesophageal reflux disease), and Hyperlipidemia.          Past Surgical History:    has a past surgical history that includes GYN surgery and Abdomen surgery.          Social History:     Social History     Tobacco Use     Smoking status: Never  Smoker     Smokeless tobacco: Never Used   Substance Use Topics     Alcohol use: Not Currently             Family History:     Family History   Problem Relation Age of Onset     Depression Mother      Suicidality Mother      Diabetes Father      Multiple Sclerosis Father      Myocardial Infarction Father                 Allergies:   No Known Allergies          Medications:     Current Outpatient Medications:      aspirin 81 MG EC tablet, Take 81 mg by mouth daily, Disp: , Rfl:      celecoxib (CELEBREX) 200 MG capsule, Take 1 capsule by mouth 2 times daily, Disp: , Rfl:      cholecalciferol (VITAMIN D3) 25 mcg (1000 units) capsule, Take 2 capsules by mouth daily, Disp: , Rfl:      docusate sodium (COLACE) 50 MG capsule, Take 50 mg by mouth 2 times daily as needed for constipation, Disp: , Rfl:      donepezil (ARICEPT) 10 MG tablet, Take 1 tablet (10 mg) by mouth At Bedtime, Disp: 90 tablet, Rfl: 3     glucosamine-chondroitinoitin 7558-2812 MG/30ML LIQD,  See Instructions, Instructions: 2 tabs Oral daily, 0 Refill(s), Type: Maintenance, Disp: , Rfl:      magnesium oxide 400 MG CAPS, Take 1 capsule by mouth daily, Disp: , Rfl:      memantine (NAMENDA) 10 MG tablet, Take 1 tablet (10 mg) by mouth 2 times daily, Disp: 180 tablet, Rfl: 3     mirtazapine (REMERON) 30 MG tablet, Take 1 tablet (30 mg) by mouth At Bedtime, Disp: 90 tablet, Rfl: 3     Multiple Vitamins-Minerals (MULTIVITAMIN ADULT PO), Take 1 tablet by mouth daily, Disp: , Rfl:      Omega-3 Fatty Acids (FISH OIL) 500 MG CAPS, Take 1 capsule by mouth daily, Disp: , Rfl:      QUEtiapine (SEROQUEL) 25 MG tablet, Take 1 tablet (25 mg) by mouth 2 times daily, Disp: 180 tablet, Rfl: 3     rivastigmine (EXELON) 9.5 MG/24HR 24 hr patch, Place 1 patch onto the skin daily, Disp: 90 patch, Rfl: 3     vitamin C (ASCORBIC ACID) 500 MG tablet, Take 1 tablet by mouth daily, Disp: , Rfl:               Physical Exam:     /88 (BP Location: Right arm, Patient Position:  "Sitting)   Pulse 71   Ht 1.473 m (4' 10\")   Wt 102.5 kg (226 lb)   LMP  (LMP Unknown)   BMI 47.23 kg/m     Awake, alert, no aphasia, no dysarthria  Oriented x3, attention is fine  She cannot tell me the year or the month  She cannot name the president  She can calculate quarters in $1 and quarters in $1.50  She does fine on naming and repeating  Recall is 0 out of 2 at about 3 minutes.  Cranial nerves II - XII tested and intact, no nystagmus  There is no focal or generalized weakness in the extremities  Rapid alternating movements are normal on both sides  Sensation is normal  Gait is normal     About 10 times during our visit today she gets up to move to a chair on the opposite side of the room, then after just a few seconds gets up to go back to her original chair.    Demond Albrecht MD            Again, thank you for allowing me to participate in the care of your patient.        Sincerely,        Demond Albrecht MD    "

## 2021-05-17 NOTE — NURSING NOTE
Chief Complaint   Patient presents with     Follow Up     Follow up on dimentia.      Travis Gamez MA

## 2021-05-19 NOTE — PROGRESS NOTES
[facility] Neurology  [place]    Jasmyne Madison MRN# [id number]   Age: 65 year old YOB: 1955               Assessment and Plan:   Assessment:   Dementia consistent with Alzheimer disease        Plan:     We will continue the memantine and quetiapine.  Her  asks about the Exelon patch which has worked well for a family member elsewhere.  We will switch her donepezil over to the rivastigmine patch to see if this improves behavior or focus and attention.  Again I mentioned a more supervised setting such as memory care unit.             Chief Complaint/HPI:     I saw Jasmyne and her  for follow-up today.  She continues to have significant cognitive difficulties.  She remains at home with her .  A son and a granddaughter check in at least once a week each and this gives her  some respite.  The patient is more compliant with her son and grand daughter, with her  she continues to be argumentative at times.  She will be up at night and will watch TV, this might happen 3 times a week or so.  Many times a day she will go to the attached garage to get in the car as though going somewhere.  Her  keeps the car locked and has a safety lock on the garage door so that she cannot go anywhere.  There is not a clear history of increased confusion or agitation at night.  She does resist bathing etc.            Past Medical History:    has a past medical history of Dementia (H), GERD (gastroesophageal reflux disease), and Hyperlipidemia.          Past Surgical History:    has a past surgical history that includes GYN surgery and Abdomen surgery.          Social History:     Social History     Tobacco Use     Smoking status: Never Smoker     Smokeless tobacco: Never Used   Substance Use Topics     Alcohol use: Not Currently             Family History:     Family History   Problem Relation Age of Onset     Depression Mother      Suicidality Mother      Diabetes Father       "Multiple Sclerosis Father      Myocardial Infarction Father                 Allergies:   No Known Allergies          Medications:     Current Outpatient Medications:      aspirin 81 MG EC tablet, Take 81 mg by mouth daily, Disp: , Rfl:      celecoxib (CELEBREX) 200 MG capsule, Take 1 capsule by mouth 2 times daily, Disp: , Rfl:      cholecalciferol (VITAMIN D3) 25 mcg (1000 units) capsule, Take 2 capsules by mouth daily, Disp: , Rfl:      docusate sodium (COLACE) 50 MG capsule, Take 50 mg by mouth 2 times daily as needed for constipation, Disp: , Rfl:      donepezil (ARICEPT) 10 MG tablet, Take 1 tablet (10 mg) by mouth At Bedtime, Disp: 90 tablet, Rfl: 3     glucosamine-chondroitinoitin 6933-0778 MG/30ML LIQD,  See Instructions, Instructions: 2 tabs Oral daily, 0 Refill(s), Type: Maintenance, Disp: , Rfl:      magnesium oxide 400 MG CAPS, Take 1 capsule by mouth daily, Disp: , Rfl:      memantine (NAMENDA) 10 MG tablet, Take 1 tablet (10 mg) by mouth 2 times daily, Disp: 180 tablet, Rfl: 3     mirtazapine (REMERON) 30 MG tablet, Take 1 tablet (30 mg) by mouth At Bedtime, Disp: 90 tablet, Rfl: 3     Multiple Vitamins-Minerals (MULTIVITAMIN ADULT PO), Take 1 tablet by mouth daily, Disp: , Rfl:      Omega-3 Fatty Acids (FISH OIL) 500 MG CAPS, Take 1 capsule by mouth daily, Disp: , Rfl:      QUEtiapine (SEROQUEL) 25 MG tablet, Take 1 tablet (25 mg) by mouth 2 times daily, Disp: 180 tablet, Rfl: 3     rivastigmine (EXELON) 9.5 MG/24HR 24 hr patch, Place 1 patch onto the skin daily, Disp: 90 patch, Rfl: 3     vitamin C (ASCORBIC ACID) 500 MG tablet, Take 1 tablet by mouth daily, Disp: , Rfl:               Physical Exam:     /88 (BP Location: Right arm, Patient Position: Sitting)   Pulse 71   Ht 1.473 m (4' 10\")   Wt 102.5 kg (226 lb)   LMP  (LMP Unknown)   BMI 47.23 kg/m     Awake, alert, no aphasia, no dysarthria  Oriented x3, attention is fine  She cannot tell me the year or the month  She cannot name the " president  She can calculate quarters in $1 and quarters in $1.50  She does fine on naming and repeating  Recall is 0 out of 2 at about 3 minutes.  Cranial nerves II - XII tested and intact, no nystagmus  There is no focal or generalized weakness in the extremities  Rapid alternating movements are normal on both sides  Sensation is normal  Gait is normal     About 10 times during our visit today she gets up to move to a chair on the opposite side of the room, then after just a few seconds gets up to go back to her original chair.    Demond Albrecht MD

## 2021-05-27 ENCOUNTER — RECORDS - HEALTHEAST (OUTPATIENT)
Dept: ADMINISTRATIVE | Facility: CLINIC | Age: 66
End: 2021-05-27

## 2021-05-29 ENCOUNTER — RECORDS - HEALTHEAST (OUTPATIENT)
Dept: ADMINISTRATIVE | Facility: CLINIC | Age: 66
End: 2021-05-29

## 2021-05-30 ENCOUNTER — RECORDS - HEALTHEAST (OUTPATIENT)
Dept: ADMINISTRATIVE | Facility: CLINIC | Age: 66
End: 2021-05-30

## 2021-07-13 ENCOUNTER — RECORDS - HEALTHEAST (OUTPATIENT)
Dept: ADMINISTRATIVE | Facility: CLINIC | Age: 66
End: 2021-07-13

## 2021-07-21 ENCOUNTER — RECORDS - HEALTHEAST (OUTPATIENT)
Dept: ADMINISTRATIVE | Facility: CLINIC | Age: 66
End: 2021-07-21

## 2021-07-22 ENCOUNTER — RECORDS - HEALTHEAST (OUTPATIENT)
Dept: SCHEDULING | Facility: CLINIC | Age: 66
End: 2021-07-22

## 2021-07-22 DIAGNOSIS — Z12.31 OTHER SCREENING MAMMOGRAM: ICD-10-CM

## 2021-10-10 ENCOUNTER — HEALTH MAINTENANCE LETTER (OUTPATIENT)
Age: 66
End: 2021-10-10

## 2022-01-30 ENCOUNTER — HEALTH MAINTENANCE LETTER (OUTPATIENT)
Age: 67
End: 2022-01-30

## 2022-05-17 ENCOUNTER — MEDICAL CORRESPONDENCE (OUTPATIENT)
Dept: SCHEDULING | Facility: CLINIC | Age: 67
End: 2022-05-17
Payer: COMMERCIAL

## 2022-05-17 ENCOUNTER — LAB REQUISITION (OUTPATIENT)
Dept: LAB | Facility: CLINIC | Age: 67
End: 2022-05-17

## 2022-05-17 DIAGNOSIS — M17.0 BILATERAL PRIMARY OSTEOARTHRITIS OF KNEE: ICD-10-CM

## 2022-05-17 LAB
ANION GAP SERPL CALCULATED.3IONS-SCNC: 10 MMOL/L (ref 5–18)
BUN SERPL-MCNC: 29 MG/DL (ref 8–22)
CALCIUM SERPL-MCNC: 9.5 MG/DL (ref 8.5–10.5)
CHLORIDE BLD-SCNC: 105 MMOL/L (ref 98–107)
CO2 SERPL-SCNC: 25 MMOL/L (ref 22–31)
CREAT SERPL-MCNC: 0.66 MG/DL (ref 0.6–1.1)
GFR SERPL CREATININE-BSD FRML MDRD: >90 ML/MIN/1.73M2
GLUCOSE BLD-MCNC: 98 MG/DL (ref 70–125)
POTASSIUM BLD-SCNC: 4.5 MMOL/L (ref 3.5–5)
SODIUM SERPL-SCNC: 140 MMOL/L (ref 136–145)

## 2022-05-17 PROCEDURE — 80048 BASIC METABOLIC PNL TOTAL CA: CPT | Performed by: FAMILY MEDICINE

## 2022-05-21 DIAGNOSIS — G30.1 LATE ONSET ALZHEIMER'S DISEASE WITH BEHAVIORAL DISTURBANCE (H): ICD-10-CM

## 2022-05-21 DIAGNOSIS — F02.818 LATE ONSET ALZHEIMER'S DISEASE WITH BEHAVIORAL DISTURBANCE (H): ICD-10-CM

## 2022-05-23 RX ORDER — MIRTAZAPINE 30 MG/1
30 TABLET, FILM COATED ORAL AT BEDTIME
Qty: 30 TABLET | Refills: 0 | Status: SHIPPED | OUTPATIENT
Start: 2022-05-23 | End: 2022-06-20

## 2022-05-23 NOTE — TELEPHONE ENCOUNTER
Refill request for Remeron. Pt last seen 5/17/21 by Dr. Albrecht. Will send letter regarding need for follow up. Will also send 30 day supply with zero refills.     Rosangela Ferguson RN on 5/23/2022 at 9:14 AM

## 2022-05-25 ENCOUNTER — HOSPITAL ENCOUNTER (OUTPATIENT)
Dept: MAMMOGRAPHY | Facility: CLINIC | Age: 67
Discharge: HOME | End: 2022-05-25
Attending: FAMILY MEDICINE | Admitting: FAMILY MEDICINE
Payer: COMMERCIAL

## 2022-05-25 DIAGNOSIS — Z12.31 ENCOUNTER FOR SCREENING MAMMOGRAM FOR MALIGNANT NEOPLASM OF BREAST: ICD-10-CM

## 2022-05-25 PROCEDURE — 77067 SCR MAMMO BI INCL CAD: CPT

## 2022-06-18 DIAGNOSIS — G30.1 LATE ONSET ALZHEIMER'S DISEASE WITH BEHAVIORAL DISTURBANCE (H): ICD-10-CM

## 2022-06-18 DIAGNOSIS — F02.818 LATE ONSET ALZHEIMER'S DISEASE WITH BEHAVIORAL DISTURBANCE (H): ICD-10-CM

## 2022-06-20 RX ORDER — MIRTAZAPINE 30 MG/1
30 TABLET, FILM COATED ORAL AT BEDTIME
Qty: 14 TABLET | Refills: 0 | Status: SHIPPED | OUTPATIENT
Start: 2022-06-20

## 2022-06-20 NOTE — TELEPHONE ENCOUNTER
Refill request for Remeron. Pt last seen 5/17/21 and is due for follow up. Will send 14 day supply with zero refills.     Rosangela Ferguson RN on 6/20/2022 at 10:39 AM

## 2022-06-24 DIAGNOSIS — G30.1 LATE ONSET ALZHEIMER'S DISEASE WITH BEHAVIORAL DISTURBANCE (H): ICD-10-CM

## 2022-06-24 DIAGNOSIS — F02.818 LATE ONSET ALZHEIMER'S DISEASE WITH BEHAVIORAL DISTURBANCE (H): ICD-10-CM

## 2022-06-24 RX ORDER — MEMANTINE HYDROCHLORIDE 10 MG/1
10 TABLET ORAL 2 TIMES DAILY
Qty: 14 TABLET | Refills: 0 | Status: SHIPPED | OUTPATIENT
Start: 2022-06-24

## 2022-06-24 NOTE — TELEPHONE ENCOUNTER
Refill request for Ángela. Pt last seen 5/17/21 and is due for follow up. Will send 7 day supply with zero refills.

## 2022-07-09 DIAGNOSIS — F02.818 LATE ONSET ALZHEIMER'S DISEASE WITH BEHAVIORAL DISTURBANCE (H): ICD-10-CM

## 2022-07-09 DIAGNOSIS — G30.1 LATE ONSET ALZHEIMER'S DISEASE WITH BEHAVIORAL DISTURBANCE (H): ICD-10-CM

## 2022-07-11 RX ORDER — QUETIAPINE FUMARATE 25 MG/1
25 TABLET, FILM COATED ORAL 2 TIMES DAILY
Qty: 14 TABLET | Refills: 0 | Status: SHIPPED | OUTPATIENT
Start: 2022-07-11

## 2022-07-11 NOTE — TELEPHONE ENCOUNTER
Refill request for Seroquel. Pt last seen 5/17/21 and is currently overdue for follow up. Will send in 7 day supply with zero refills.     Rosangela Ferguson RN on 7/11/2022 at 9:18 AM

## 2022-09-24 ENCOUNTER — HEALTH MAINTENANCE LETTER (OUTPATIENT)
Age: 67
End: 2022-09-24

## 2023-01-16 ENCOUNTER — LAB REQUISITION (OUTPATIENT)
Dept: LAB | Facility: CLINIC | Age: 68
End: 2023-01-16
Payer: COMMERCIAL

## 2023-01-16 DIAGNOSIS — E78.2 MIXED HYPERLIPIDEMIA: ICD-10-CM

## 2023-01-18 LAB
ALBUMIN SERPL BCG-MCNC: 4 G/DL (ref 3.5–5.2)
ALP SERPL-CCNC: 122 U/L (ref 35–104)
ALT SERPL W P-5'-P-CCNC: 29 U/L (ref 10–35)
ANION GAP SERPL CALCULATED.3IONS-SCNC: 17 MMOL/L (ref 7–15)
AST SERPL W P-5'-P-CCNC: 34 U/L (ref 10–35)
BASOPHILS # BLD AUTO: 0 10E3/UL (ref 0–0.2)
BASOPHILS NFR BLD AUTO: 0 %
BILIRUB SERPL-MCNC: 0.3 MG/DL
BUN SERPL-MCNC: 34.4 MG/DL (ref 8–23)
CALCIUM SERPL-MCNC: 9.5 MG/DL (ref 8.8–10.2)
CHLORIDE SERPL-SCNC: 106 MMOL/L (ref 98–107)
CREAT SERPL-MCNC: 0.7 MG/DL (ref 0.51–0.95)
DEPRECATED HCO3 PLAS-SCNC: 17 MMOL/L (ref 22–29)
EOSINOPHIL # BLD AUTO: 0 10E3/UL (ref 0–0.7)
EOSINOPHIL NFR BLD AUTO: 0 %
ERYTHROCYTE [DISTWIDTH] IN BLOOD BY AUTOMATED COUNT: 14.6 % (ref 10–15)
GFR SERPL CREATININE-BSD FRML MDRD: >90 ML/MIN/1.73M2
GLUCOSE SERPL-MCNC: 117 MG/DL (ref 70–99)
HCT VFR BLD AUTO: 43.4 % (ref 35–47)
HGB BLD-MCNC: 13.6 G/DL (ref 11.7–15.7)
IMM GRANULOCYTES # BLD: 0 10E3/UL
IMM GRANULOCYTES NFR BLD: 0 %
LYMPHOCYTES # BLD AUTO: 0.4 10E3/UL (ref 0.8–5.3)
LYMPHOCYTES NFR BLD AUTO: 4 %
MCH RBC QN AUTO: 30.8 PG (ref 26.5–33)
MCHC RBC AUTO-ENTMCNC: 31.3 G/DL (ref 31.5–36.5)
MCV RBC AUTO: 98 FL (ref 78–100)
MONOCYTES # BLD AUTO: 0.3 10E3/UL (ref 0–1.3)
MONOCYTES NFR BLD AUTO: 3 %
NEUTROPHILS # BLD AUTO: 9.3 10E3/UL (ref 1.6–8.3)
NEUTROPHILS NFR BLD AUTO: 93 %
NRBC # BLD AUTO: 0 10E3/UL
NRBC BLD AUTO-RTO: 0 /100
PLATELET # BLD AUTO: 244 10E3/UL (ref 150–450)
POTASSIUM SERPL-SCNC: 4.6 MMOL/L (ref 3.4–5.3)
PROT SERPL-MCNC: 7.2 G/DL (ref 6.4–8.3)
RBC # BLD AUTO: 4.42 10E6/UL (ref 3.8–5.2)
SODIUM SERPL-SCNC: 140 MMOL/L (ref 136–145)
TSH SERPL DL<=0.005 MIU/L-ACNC: 1.71 UIU/ML (ref 0.3–4.2)
VIT B12 SERPL-MCNC: 1033 PG/ML (ref 232–1245)
WBC # BLD AUTO: 10 10E3/UL (ref 4–11)

## 2023-01-18 PROCEDURE — 85025 COMPLETE CBC W/AUTO DIFF WBC: CPT | Mod: ORL | Performed by: PHYSICIAN ASSISTANT

## 2023-01-18 PROCEDURE — 80053 COMPREHEN METABOLIC PANEL: CPT | Mod: ORL | Performed by: PHYSICIAN ASSISTANT

## 2023-01-18 PROCEDURE — 82607 VITAMIN B-12: CPT | Mod: ORL | Performed by: PHYSICIAN ASSISTANT

## 2023-01-18 PROCEDURE — 36415 COLL VENOUS BLD VENIPUNCTURE: CPT | Mod: ORL | Performed by: PHYSICIAN ASSISTANT

## 2023-01-18 PROCEDURE — P9604 ONE-WAY ALLOW PRORATED TRIP: HCPCS | Mod: ORL | Performed by: PHYSICIAN ASSISTANT

## 2023-01-18 PROCEDURE — 84443 ASSAY THYROID STIM HORMONE: CPT | Mod: ORL | Performed by: PHYSICIAN ASSISTANT

## 2023-01-31 ENCOUNTER — LAB REQUISITION (OUTPATIENT)
Dept: LAB | Facility: CLINIC | Age: 68
End: 2023-01-31
Payer: COMMERCIAL

## 2023-01-31 DIAGNOSIS — E78.2 MIXED HYPERLIPIDEMIA: ICD-10-CM

## 2023-02-01 LAB
CHOLEST SERPL-MCNC: 230 MG/DL
HBA1C MFR BLD: 5.6 %
HDLC SERPL-MCNC: 61 MG/DL
LDLC SERPL CALC-MCNC: 135 MG/DL
NONHDLC SERPL-MCNC: 169 MG/DL
TRIGL SERPL-MCNC: 168 MG/DL

## 2023-02-01 PROCEDURE — 83036 HEMOGLOBIN GLYCOSYLATED A1C: CPT | Mod: ORL | Performed by: NURSE PRACTITIONER

## 2023-02-01 PROCEDURE — P9604 ONE-WAY ALLOW PRORATED TRIP: HCPCS | Mod: ORL | Performed by: NURSE PRACTITIONER

## 2023-02-01 PROCEDURE — 36415 COLL VENOUS BLD VENIPUNCTURE: CPT | Mod: ORL | Performed by: NURSE PRACTITIONER

## 2023-02-01 PROCEDURE — 80061 LIPID PANEL: CPT | Mod: ORL | Performed by: NURSE PRACTITIONER

## 2023-05-08 ENCOUNTER — HEALTH MAINTENANCE LETTER (OUTPATIENT)
Age: 68
End: 2023-05-08

## 2023-06-13 ENCOUNTER — HOSPITAL ENCOUNTER (OUTPATIENT)
Dept: MAMMOGRAPHY | Facility: CLINIC | Age: 68
Discharge: HOME | End: 2023-06-13
Attending: FAMILY MEDICINE | Admitting: FAMILY MEDICINE
Payer: COMMERCIAL

## 2023-06-13 DIAGNOSIS — Z12.31 VISIT FOR SCREENING MAMMOGRAM: ICD-10-CM

## 2023-06-13 PROCEDURE — 77067 SCR MAMMO BI INCL CAD: CPT

## 2023-06-22 ENCOUNTER — HOSPITAL ENCOUNTER (OUTPATIENT)
Dept: MAMMOGRAPHY | Facility: CLINIC | Age: 68
Discharge: HOME | End: 2023-06-22
Attending: FAMILY MEDICINE | Admitting: FAMILY MEDICINE
Payer: COMMERCIAL

## 2023-06-22 DIAGNOSIS — R92.8 OTHER ABNORMAL AND INCONCLUSIVE FINDINGS ON DIAGNOSTIC IMAGING OF BREAST: ICD-10-CM

## 2023-06-22 DIAGNOSIS — N63.10 MASS OF BOTH BREASTS ON MAMMOGRAM: ICD-10-CM

## 2023-06-22 DIAGNOSIS — N63.20 MASS OF BOTH BREASTS ON MAMMOGRAM: ICD-10-CM

## 2023-06-22 PROCEDURE — 76642 ULTRASOUND BREAST LIMITED: CPT | Mod: 50

## 2023-12-18 ENCOUNTER — HOSPITAL ENCOUNTER (OUTPATIENT)
Dept: MAMMOGRAPHY | Facility: CLINIC | Age: 68
Discharge: HOME | End: 2023-12-18
Attending: FAMILY MEDICINE
Payer: COMMERCIAL

## 2023-12-18 DIAGNOSIS — N63.10 MASS OF BOTH BREASTS ON MAMMOGRAM: ICD-10-CM

## 2023-12-18 DIAGNOSIS — R92.8 OTHER ABNORMAL AND INCONCLUSIVE FINDINGS ON DIAGNOSTIC IMAGING OF BREAST: ICD-10-CM

## 2023-12-18 DIAGNOSIS — N63.20 MASS OF BOTH BREASTS ON MAMMOGRAM: ICD-10-CM

## 2023-12-18 PROCEDURE — 76642 ULTRASOUND BREAST LIMITED: CPT | Mod: 50

## 2024-05-14 ENCOUNTER — LAB REQUISITION (OUTPATIENT)
Dept: LAB | Facility: CLINIC | Age: 69
End: 2024-05-14
Payer: COMMERCIAL

## 2024-05-14 DIAGNOSIS — F33.3 MAJOR DEPRESSIVE DISORDER, RECURRENT, SEVERE WITH PSYCHOTIC SYMPTOMS (H): ICD-10-CM

## 2024-05-14 DIAGNOSIS — E55.9 VITAMIN D DEFICIENCY, UNSPECIFIED: ICD-10-CM

## 2024-05-14 DIAGNOSIS — G30.8 OTHER ALZHEIMER'S DISEASE (CODE) (H): ICD-10-CM

## 2024-05-14 DIAGNOSIS — E78.2 MIXED HYPERLIPIDEMIA: ICD-10-CM

## 2024-05-15 LAB
ERYTHROCYTE [DISTWIDTH] IN BLOOD BY AUTOMATED COUNT: 14.4 % (ref 10–15)
HCT VFR BLD AUTO: 38.4 % (ref 35–47)
HGB BLD-MCNC: 12.4 G/DL (ref 11.7–15.7)
MCH RBC QN AUTO: 31 PG (ref 26.5–33)
MCHC RBC AUTO-ENTMCNC: 32.3 G/DL (ref 31.5–36.5)
MCV RBC AUTO: 96 FL (ref 78–100)
PLATELET # BLD AUTO: 260 10E3/UL (ref 150–450)
RBC # BLD AUTO: 4 10E6/UL (ref 3.8–5.2)
WBC # BLD AUTO: 5 10E3/UL (ref 4–11)

## 2024-05-15 PROCEDURE — 82306 VITAMIN D 25 HYDROXY: CPT | Mod: ORL | Performed by: PHYSICIAN ASSISTANT

## 2024-05-15 PROCEDURE — 80061 LIPID PANEL: CPT | Mod: ORL | Performed by: PHYSICIAN ASSISTANT

## 2024-05-15 PROCEDURE — 80053 COMPREHEN METABOLIC PANEL: CPT | Mod: ORL | Performed by: PHYSICIAN ASSISTANT

## 2024-05-15 PROCEDURE — 85027 COMPLETE CBC AUTOMATED: CPT | Mod: ORL | Performed by: PHYSICIAN ASSISTANT

## 2024-05-15 PROCEDURE — P9603 ONE-WAY ALLOW PRORATED MILES: HCPCS | Mod: ORL | Performed by: PHYSICIAN ASSISTANT

## 2024-05-15 PROCEDURE — 36415 COLL VENOUS BLD VENIPUNCTURE: CPT | Mod: ORL | Performed by: PHYSICIAN ASSISTANT

## 2024-05-16 LAB
ALBUMIN SERPL BCG-MCNC: 3.8 G/DL (ref 3.5–5.2)
ALP SERPL-CCNC: 105 U/L (ref 40–150)
ALT SERPL W P-5'-P-CCNC: 13 U/L (ref 0–50)
ANION GAP SERPL CALCULATED.3IONS-SCNC: 11 MMOL/L (ref 7–15)
AST SERPL W P-5'-P-CCNC: 18 U/L (ref 0–45)
BILIRUB SERPL-MCNC: 0.2 MG/DL
BUN SERPL-MCNC: 22.2 MG/DL (ref 8–23)
CALCIUM SERPL-MCNC: 8.8 MG/DL (ref 8.8–10.2)
CHLORIDE SERPL-SCNC: 104 MMOL/L (ref 98–107)
CHOLEST SERPL-MCNC: 230 MG/DL
CREAT SERPL-MCNC: 0.61 MG/DL (ref 0.51–0.95)
DEPRECATED HCO3 PLAS-SCNC: 23 MMOL/L (ref 22–29)
EGFRCR SERPLBLD CKD-EPI 2021: >90 ML/MIN/1.73M2
FASTING STATUS PATIENT QL REPORTED: NO
FASTING STATUS PATIENT QL REPORTED: NO
GLUCOSE SERPL-MCNC: 76 MG/DL (ref 70–99)
HDLC SERPL-MCNC: 57 MG/DL
LDLC SERPL CALC-MCNC: 144 MG/DL
NONHDLC SERPL-MCNC: 173 MG/DL
POTASSIUM SERPL-SCNC: 3.9 MMOL/L (ref 3.4–5.3)
PROT SERPL-MCNC: 6.4 G/DL (ref 6.4–8.3)
SODIUM SERPL-SCNC: 138 MMOL/L (ref 135–145)
TRIGL SERPL-MCNC: 147 MG/DL
VIT D+METAB SERPL-MCNC: 46 NG/ML (ref 20–50)

## 2024-07-14 ENCOUNTER — HEALTH MAINTENANCE LETTER (OUTPATIENT)
Age: 69
End: 2024-07-14

## 2025-01-28 ENCOUNTER — MEDICAL CORRESPONDENCE (OUTPATIENT)
Dept: HEALTH INFORMATION MANAGEMENT | Facility: CLINIC | Age: 70
End: 2025-01-28

## 2025-02-25 ENCOUNTER — APPOINTMENT (OUTPATIENT)
Dept: CT IMAGING | Facility: CLINIC | Age: 70
End: 2025-02-25
Attending: EMERGENCY MEDICINE
Payer: COMMERCIAL

## 2025-02-25 ENCOUNTER — HOSPITAL ENCOUNTER (EMERGENCY)
Facility: CLINIC | Age: 70
Discharge: HOME | End: 2025-02-26
Attending: EMERGENCY MEDICINE | Admitting: EMERGENCY MEDICINE
Payer: COMMERCIAL

## 2025-02-25 DIAGNOSIS — E86.0 DEHYDRATION: ICD-10-CM

## 2025-02-25 DIAGNOSIS — E87.0 HYPERNATREMIA: ICD-10-CM

## 2025-02-25 LAB
ALBUMIN SERPL BCG-MCNC: 3.9 G/DL (ref 3.5–5.2)
ALP SERPL-CCNC: 101 U/L (ref 40–150)
ALT SERPL W P-5'-P-CCNC: 12 U/L (ref 0–50)
ANION GAP SERPL CALCULATED.3IONS-SCNC: 10 MMOL/L (ref 7–15)
AST SERPL W P-5'-P-CCNC: 19 U/L (ref 0–45)
BASOPHILS # BLD AUTO: 0.1 10E3/UL (ref 0–0.2)
BASOPHILS NFR BLD AUTO: 1 %
BILIRUB SERPL-MCNC: 0.3 MG/DL
BUN SERPL-MCNC: 33.8 MG/DL (ref 8–23)
CALCIUM SERPL-MCNC: 9.4 MG/DL (ref 8.8–10.4)
CHLORIDE SERPL-SCNC: 115 MMOL/L (ref 98–107)
CREAT SERPL-MCNC: 0.73 MG/DL (ref 0.51–0.95)
EGFRCR SERPLBLD CKD-EPI 2021: 87 ML/MIN/1.73M2
EOSINOPHIL # BLD AUTO: 0.2 10E3/UL (ref 0–0.7)
EOSINOPHIL NFR BLD AUTO: 3 %
ERYTHROCYTE [DISTWIDTH] IN BLOOD BY AUTOMATED COUNT: 15.7 % (ref 10–15)
GLUCOSE SERPL-MCNC: 112 MG/DL (ref 70–99)
HCO3 SERPL-SCNC: 22 MMOL/L (ref 22–29)
HCT VFR BLD AUTO: 37.7 % (ref 35–47)
HGB BLD-MCNC: 12.3 G/DL (ref 11.7–15.7)
IMM GRANULOCYTES # BLD: 0 10E3/UL
IMM GRANULOCYTES NFR BLD: 0 %
LYMPHOCYTES # BLD AUTO: 2.6 10E3/UL (ref 0.8–5.3)
LYMPHOCYTES NFR BLD AUTO: 38 %
MCH RBC QN AUTO: 31.1 PG (ref 26.5–33)
MCHC RBC AUTO-ENTMCNC: 32.6 G/DL (ref 31.5–36.5)
MCV RBC AUTO: 95 FL (ref 78–100)
MONOCYTES # BLD AUTO: 0.6 10E3/UL (ref 0–1.3)
MONOCYTES NFR BLD AUTO: 8 %
NEUTROPHILS # BLD AUTO: 3.4 10E3/UL (ref 1.6–8.3)
NEUTROPHILS NFR BLD AUTO: 50 %
NRBC # BLD AUTO: 0 10E3/UL
NRBC BLD AUTO-RTO: 0 /100
PLATELET # BLD AUTO: 226 10E3/UL (ref 150–450)
POTASSIUM SERPL-SCNC: 3.9 MMOL/L (ref 3.4–5.3)
PROT SERPL-MCNC: 7 G/DL (ref 6.4–8.3)
RBC # BLD AUTO: 3.95 10E6/UL (ref 3.8–5.2)
SODIUM SERPL-SCNC: 147 MMOL/L (ref 135–145)
TROPONIN T SERPL HS-MCNC: 9 NG/L
WBC # BLD AUTO: 6.8 10E3/UL (ref 4–11)

## 2025-02-25 PROCEDURE — 84484 ASSAY OF TROPONIN QUANT: CPT | Performed by: EMERGENCY MEDICINE

## 2025-02-25 PROCEDURE — 85048 AUTOMATED LEUKOCYTE COUNT: CPT | Performed by: EMERGENCY MEDICINE

## 2025-02-25 PROCEDURE — 93005 ELECTROCARDIOGRAM TRACING: CPT | Performed by: EMERGENCY MEDICINE

## 2025-02-25 PROCEDURE — 99285 EMERGENCY DEPT VISIT HI MDM: CPT | Mod: 25

## 2025-02-25 PROCEDURE — 70450 CT HEAD/BRAIN W/O DYE: CPT

## 2025-02-25 PROCEDURE — 85004 AUTOMATED DIFF WBC COUNT: CPT | Performed by: EMERGENCY MEDICINE

## 2025-02-25 PROCEDURE — 82040 ASSAY OF SERUM ALBUMIN: CPT | Performed by: EMERGENCY MEDICINE

## 2025-02-25 PROCEDURE — 36415 COLL VENOUS BLD VENIPUNCTURE: CPT | Performed by: EMERGENCY MEDICINE

## 2025-02-25 PROCEDURE — 80053 COMPREHEN METABOLIC PANEL: CPT | Performed by: EMERGENCY MEDICINE

## 2025-02-25 ASSESSMENT — ACTIVITIES OF DAILY LIVING (ADL)
ADLS_ACUITY_SCORE: 41
ADLS_ACUITY_SCORE: 41

## 2025-02-25 ASSESSMENT — COLUMBIA-SUICIDE SEVERITY RATING SCALE - C-SSRS: IS THE PATIENT NOT ABLE TO COMPLETE C-SSRS: UNABLE TO VERBALIZE

## 2025-02-26 VITALS
BODY MASS INDEX: 35.74 KG/M2 | DIASTOLIC BLOOD PRESSURE: 73 MMHG | WEIGHT: 171 LBS | TEMPERATURE: 97.4 F | RESPIRATION RATE: 17 BRPM | HEART RATE: 63 BPM | SYSTOLIC BLOOD PRESSURE: 150 MMHG | OXYGEN SATURATION: 98 %

## 2025-02-26 LAB
ATRIAL RATE - MUSE: 66 BPM
DIASTOLIC BLOOD PRESSURE - MUSE: 71 MMHG
INTERPRETATION ECG - MUSE: NORMAL
P AXIS - MUSE: 27 DEGREES
PR INTERVAL - MUSE: 160 MS
QRS DURATION - MUSE: 90 MS
QT - MUSE: 430 MS
QTC - MUSE: 450 MS
R AXIS - MUSE: -34 DEGREES
SYSTOLIC BLOOD PRESSURE - MUSE: 118 MMHG
T AXIS - MUSE: 19 DEGREES
VENTRICULAR RATE- MUSE: 66 BPM

## 2025-02-26 NOTE — ED NOTES
Bed: [facility]-  Expected date: 2/25/25  Expected time: 9:17 PM  Means of arrival: Ambulance  Comments:  EMS: Rasina  Age/gender: 69/F.  CC: 2 minute unresponsive episode

## 2025-02-26 NOTE — ED NOTES
Called pt [facility] in [place], [phone]. Facility knows patient is coming back.

## 2025-02-26 NOTE — ED PROVIDER NOTES
EMERGENCY DEPARTMENT ENCOUNTER      NAME: Jasmyne Madison  AGE: 69 year old female  YOB: 1955  MRN: [id number]  EVALUATION DATE & TIME: 2/25/2025  9:26 PM    PCP: Carmen Rodriguez    ED PROVIDER: Gonsalo Siddiqui M.D.      Chief Complaint   Patient presents with    Shaking         FINAL IMPRESSION:  1. Dehydration    2. Hypernatremia          ED COURSE & MEDICAL DECISION MAKING:    Pertinent Labs & Imaging studies reviewed. (See chart for details)  69 year old female presents to the Emergency Department for evaluation of presents with altered mental status.  Patient has a history of dementia.  Somewhat difficult to get history.  Apparently had an episode of unresponsiveness on the couch though does not sound like she had an arrest.  Sounds like she was just difficult to arouse.  There may have been some shaking.  Is unclear if this was seizures.  No signs of trauma.  Seems to be at her baseline and neurologically intact though somewhat difficult exam given her dementia.  Head CT is done and is negative.  Labs are notable for mildly elevated sodium of 147.  Her chloride is also elevated.  Likely due to dehydration.  Did have a discussion with family.  I think it is reasonable just to encourage p.o. with her.  I do not think she needs admission at this time.  They are agreeable to this.  Will go back to her care facility.  They will recheck her labs in a couple of days to make sure it is not getting worse.  She will return for any worsening symptoms.  Family is agreeable to this.    9:31 PM I met with the patient to gather history and to perform my initial exam. I discussed the plan for care while in the Emergency Department.       At the conclusion of the encounter I discussed the results of all of the tests and the disposition. The questions were answered. The patient or family acknowledged understanding and was agreeable with the care plan.     Medical Decision Making  Obtained supplemental  history:Supplemental history obtained?: Documented in chart, Family Member/Significant Other, and EMS  Reviewed external records: External records reviewed?: Documented in chart and Outpatient Record: Neuro visit 5/17/21  Care impacted by chronic illness:Dementia  Did you consider but not order tests?: Work up considered but not performed and documented in chart, if applicable  Did you interpret images independently?: Independent interpretation of ECG and images noted in documentation, when applicable.  Consultation discussion with other provider:Did you involve another provider (consultant, , pharmacy, etc.)?: No  Discharge. No recommendations on prescription strength medication(s). I considered admission, but discharged the patient after share decision making conversation.    MIPS (CTPE, Dental pain, Greco, Sinusitis, Asthma/COPD, Head Trauma): Not Applicable           MEDICATIONS GIVEN IN THE EMERGENCY:  Medications - No data to display    NEW PRESCRIPTIONS STARTED AT TODAY'S ER VISIT  Discharge Medication List as of 2/25/2025 11:40 PM             =================================================================    HPI    Patient information was obtained from:  and EMS    Jasmyne Madison is a 69 year old female with a pertinent history of Dementia who presents to this ED for evaluation of unresponsive episode.  Patient was in her care facility.  They noted her sleeping on the couch.  They had difficulty waking her up.  When they attempted to wake her up they noted she had a minute of a shaking episode.  Not able to get a better description of this.  Patient is nonverbal and demented at baseline.  Not able to give any history.  Did discuss with .  States that she has been in the care facility for 2 years.  Has lost about 30 pounds since she came there.   attributes that to eating better.  He has noted that her appetite has been down lately.  He typically has dinner with her 3 times a week.   Notes she more plays with her foods and actually eats it.  No noted fevers.  Has not been otherwise ill lately.  No nausea or vomiting that he knows of.        PAST MEDICAL HISTORY:  Past Medical History:   Diagnosis Date    Dementia (H)     GERD (gastroesophageal reflux disease)     Hyperlipidemia        PAST SURGICAL HISTORY:  Past Surgical History:   Procedure Laterality Date    ABDOMEN SURGERY      GYN SURGERY      MAMMOPLASTY REDUCTION  2007           CURRENT MEDICATIONS:    No current facility-administered medications for this encounter.     Current Outpatient Medications   Medication Sig Dispense Refill    aspirin 81 MG EC tablet Take 81 mg by mouth daily      celecoxib (CELEBREX) 200 MG capsule Take 1 capsule by mouth 2 times daily      cholecalciferol (VITAMIN D3) 25 mcg (1000 units) capsule Take 2 capsules by mouth daily      docusate sodium (COLACE) 50 MG capsule Take 50 mg by mouth 2 times daily as needed for constipation      donepezil (ARICEPT) 10 MG tablet Take 1 tablet (10 mg) by mouth At Bedtime 90 tablet 3    glucosamine-chondroitinoitin 0653-5844 MG/30ML LIQD   See Instructions, Instructions: 2 tabs Oral daily, 0 Refill(s), Type: Maintenance      magnesium oxide 400 MG CAPS Take 1 capsule by mouth daily      memantine (NAMENDA) 10 MG tablet TAKE 1 TABLET (10 MG) BY MOUTH 2 TIMES DAILY **FOLLOW UP NEEDED FOR REFILLS** 14 tablet 0    mirtazapine (REMERON) 30 MG tablet TAKE 1 TABLET (30 MG) BY MOUTH AT BEDTIME **FOLLOW UP NEEDED FOR REFILLS** 14 tablet 0    Multiple Vitamins-Minerals (MULTIVITAMIN ADULT PO) Take 1 tablet by mouth daily      Omega-3 Fatty Acids (FISH OIL) 500 MG CAPS Take 1 capsule by mouth daily      QUEtiapine (SEROQUEL) 25 MG tablet Take 1 tablet (25 mg) by mouth 2 times daily **FOLLOW UP NEEDED FOR REFILLS** 14 tablet 0    rivastigmine (EXELON) 9.5 MG/24HR 24 hr patch Place 1 patch onto the skin daily 90 patch 3    vitamin C (ASCORBIC ACID) 500 MG tablet Take 1 tablet by mouth  daily           ALLERGIES:  No Known Allergies    FAMILY HISTORY:  Family History   Problem Relation Age of Onset    Depression Mother     Suicidality Mother     Diabetes Father     Multiple Sclerosis Father     Myocardial Infarction Father     Breast Cancer Maternal Aunt 50.00       SOCIAL HISTORY:   Social History     Socioeconomic History    Marital status:    Tobacco Use    Smoking status: Never    Smokeless tobacco: Never   Substance and Sexual Activity    Alcohol use: Not Currently    Drug use: Never    Sexual activity: Not Currently   Other Topics Concern    Parent/sibling w/ CABG, MI or angioplasty before 65F 55M? No       VITALS:  BP (!) 150/73   Pulse 63   Temp 97.4  F (36.3  C) (Axillary)   Resp 17   Wt 77.6 kg (171 lb)   LMP  (LMP Unknown)   SpO2 98%   BMI 35.74 kg/m      PHYSICAL EXAM    Physical Exam  Vitals and nursing note reviewed.   Constitutional:       General: She is not in acute distress.     Appearance: She is not diaphoretic.   HENT:      Head: Atraumatic.      Mouth/Throat:      Pharynx: No oropharyngeal exudate.   Eyes:      General: No scleral icterus.     Pupils: Pupils are equal, round, and reactive to light.   Cardiovascular:      Rate and Rhythm: Normal rate and regular rhythm.      Heart sounds: Normal heart sounds.   Pulmonary:      Effort: No respiratory distress.      Breath sounds: Normal breath sounds.   Abdominal:      Palpations: Abdomen is soft.      Tenderness: There is no abdominal tenderness. There is no guarding or rebound. Negative signs include Licona's sign.   Musculoskeletal:         General: No tenderness.   Skin:     General: Skin is warm.      Findings: No rash.   Neurological:      General: No focal deficit present.      Mental Status: She is alert.      Comments: No gross deficits.  Difficult having patient cooperate with neuroexam.  Has good strength in upper and lower extremities.  Cranial nerves grossly intact.  Was able to walk from the EMS  teja to the bed.           LAB:  All pertinent labs reviewed and interpreted.  Labs Ordered and Resulted from Time of ED Arrival to Time of ED Departure   COMPREHENSIVE METABOLIC PANEL - Abnormal       Result Value    Sodium 147 (*)     Potassium 3.9      Carbon Dioxide (CO2) 22      Anion Gap 10      Urea Nitrogen 33.8 (*)     Creatinine 0.73      GFR Estimate 87      Calcium 9.4      Chloride 115 (*)     Glucose 112 (*)     Alkaline Phosphatase 101      AST 19      ALT 12      Protein Total 7.0      Albumin 3.9      Bilirubin Total 0.3     CBC WITH PLATELETS AND DIFFERENTIAL - Abnormal    WBC Count 6.8      RBC Count 3.95      Hemoglobin 12.3      Hematocrit 37.7      MCV 95      MCH 31.1      MCHC 32.6      RDW 15.7 (*)     Platelet Count 226      % Neutrophils 50      % Lymphocytes 38      % Monocytes 8      % Eosinophils 3      % Basophils 1      % Immature Granulocytes 0      NRBCs per 100 WBC 0      Absolute Neutrophils 3.4      Absolute Lymphocytes 2.6      Absolute Monocytes 0.6      Absolute Eosinophils 0.2      Absolute Basophils 0.1      Absolute Immature Granulocytes 0.0      Absolute NRBCs 0.0     TROPONIN T, HIGH SENSITIVITY - Normal    Troponin T, High Sensitivity 9         RADIOLOGY:  Reviewed all pertinent imaging. Please see official radiology report.  CT Head w/o Contrast   Final Result   IMPRESSION:   1.  No acute intracranial process.          EKG:    Performed at: 2137  Impression: Normal EKG.  No previous available  Normal sinus rhythm rate of 66.  .  QRS 90.  QTc 450.    I have independently reviewed and interpreted the EKG(s) documented above.        Gonsalo Siddiqui M.D.  Emergency Medicine  [facility] EMERGENCY ROOM  [address] [id number] [phone]  Dept: [phone]       Gonsalo Siddiqui MD  02/26/25 0040

## 2025-02-26 NOTE — ED TRIAGE NOTES
Pt presents to ED via [facility] after staff at care facility states pt was asleep on the couch, when they went to check on her she was unresponsive and had a 2 minute episode of shaking.  POA () was called by staff who requested pt be transported to the hospital.   by EMS.  Currently at baseline per staff.      Triage Assessment (Adult)       Row Name 02/25/25 1516          Triage Assessment    Airway WDL WDL        Respiratory WDL    Respiratory WDL WDL        Skin Circulation/Temperature WDL    Skin Circulation/Temperature WDL WDL        Cardiac WDL    Cardiac WDL WDL        Peripheral/Neurovascular WDL    Peripheral Neurovascular WDL WDL        Cognitive/Neuro/Behavioral WDL    Cognitive/Neuro/Behavioral WDL X  pt non-verbal, hx of dementia- per staff report to EMS pt at baseline     Arousal Level opens eyes spontaneously     Mood/Behavior hyperactive (agitated, impulsive);restless        Pupils (CN II)    Pupil Size Left 3 mm     Pupil Size Right 3 mm        [place]orm Coma Scale    Best Eye Response 4-->(E4) spontaneous     Best Motor Response 6-->(M6) obeys commands     Best Verbal Response 1-->(V1) none  baseline     [place] Coma Scale Score 11

## 2025-03-03 ENCOUNTER — LAB REQUISITION (OUTPATIENT)
Dept: LAB | Facility: CLINIC | Age: 70
End: 2025-03-03
Payer: COMMERCIAL

## 2025-03-03 DIAGNOSIS — E87.0 HYPEROSMOLALITY AND HYPERNATREMIA: ICD-10-CM

## 2025-03-05 LAB
ANION GAP SERPL CALCULATED.3IONS-SCNC: 12 MMOL/L (ref 7–15)
BUN SERPL-MCNC: 30.7 MG/DL (ref 8–23)
CALCIUM SERPL-MCNC: 9.5 MG/DL (ref 8.8–10.4)
CHLORIDE SERPL-SCNC: 105 MMOL/L (ref 98–107)
CREAT SERPL-MCNC: 0.77 MG/DL (ref 0.51–0.95)
EGFRCR SERPLBLD CKD-EPI 2021: 82 ML/MIN/1.73M2
GLUCOSE SERPL-MCNC: 144 MG/DL (ref 70–99)
HCO3 SERPL-SCNC: 22 MMOL/L (ref 22–29)
POTASSIUM SERPL-SCNC: 4.1 MMOL/L (ref 3.4–5.3)
SODIUM SERPL-SCNC: 139 MMOL/L (ref 135–145)

## 2025-03-05 PROCEDURE — 36415 COLL VENOUS BLD VENIPUNCTURE: CPT | Mod: ORL | Performed by: FAMILY MEDICINE

## 2025-03-05 PROCEDURE — P9603 ONE-WAY ALLOW PRORATED MILES: HCPCS | Mod: ORL | Performed by: FAMILY MEDICINE

## 2025-03-05 PROCEDURE — 80048 BASIC METABOLIC PNL TOTAL CA: CPT | Mod: ORL | Performed by: FAMILY MEDICINE

## 2025-06-19 ENCOUNTER — HOSPITAL ENCOUNTER (OUTPATIENT)
Facility: HOSPITAL | Age: 70
Setting detail: OBSERVATION
End: 2025-06-19
Attending: EMERGENCY MEDICINE
Payer: COMMERCIAL

## 2025-06-19 ENCOUNTER — APPOINTMENT (OUTPATIENT)
Dept: RADIOLOGY | Facility: HOSPITAL | Age: 70
End: 2025-06-19
Attending: EMERGENCY MEDICINE
Payer: COMMERCIAL

## 2025-06-19 ENCOUNTER — MEDICAL CORRESPONDENCE (OUTPATIENT)
Dept: HEALTH INFORMATION MANAGEMENT | Facility: CLINIC | Age: 70
End: 2025-06-19

## 2025-06-19 VITALS
SYSTOLIC BLOOD PRESSURE: 118 MMHG | OXYGEN SATURATION: 93 % | BODY MASS INDEX: 38.57 KG/M2 | WEIGHT: 184.53 LBS | HEART RATE: 91 BPM | TEMPERATURE: 98.1 F | DIASTOLIC BLOOD PRESSURE: 72 MMHG | RESPIRATION RATE: 26 BRPM

## 2025-06-19 DIAGNOSIS — A41.9 SEVERE SEPSIS (H): ICD-10-CM

## 2025-06-19 DIAGNOSIS — R65.20 SEVERE SEPSIS (H): ICD-10-CM

## 2025-06-19 DIAGNOSIS — N39.0 URINARY TRACT INFECTION WITHOUT HEMATURIA, SITE UNSPECIFIED: ICD-10-CM

## 2025-06-19 LAB
ALBUMIN UR-MCNC: NEGATIVE MG/DL
ANION GAP SERPL CALCULATED.3IONS-SCNC: 17 MMOL/L (ref 7–15)
APPEARANCE UR: CLEAR
B-OH-BUTYR SERPL-SCNC: 0.25 MMOL/L
BACTERIA #/AREA URNS HPF: ABNORMAL /HPF
BILIRUB UR QL STRIP: NEGATIVE
BUN SERPL-MCNC: 30.9 MG/DL (ref 8–23)
CALCIUM SERPL-MCNC: 10.2 MG/DL (ref 8.8–10.4)
CHLORIDE SERPL-SCNC: 109 MMOL/L (ref 98–107)
COLOR UR AUTO: ABNORMAL
CREAT SERPL-MCNC: 0.65 MG/DL (ref 0.51–0.95)
EGFRCR SERPLBLD CKD-EPI 2021: >90 ML/MIN/1.73M2
ERYTHROCYTE [DISTWIDTH] IN BLOOD BY AUTOMATED COUNT: 15 % (ref 10–15)
FLUAV RNA SPEC QL NAA+PROBE: NEGATIVE
FLUBV RNA RESP QL NAA+PROBE: NEGATIVE
GLUCOSE SERPL-MCNC: 94 MG/DL (ref 70–99)
GLUCOSE UR STRIP-MCNC: NEGATIVE MG/DL
HCO3 SERPL-SCNC: 18 MMOL/L (ref 22–29)
HCT VFR BLD AUTO: 40.2 % (ref 35–47)
HGB BLD-MCNC: 13 G/DL (ref 11.7–15.7)
HGB UR QL STRIP: NEGATIVE
KETONES UR STRIP-MCNC: ABNORMAL MG/DL
LACTATE SERPL-SCNC: 3.1 MMOL/L (ref 0.7–2)
LACTATE SERPL-SCNC: 3.4 MMOL/L (ref 0.7–2)
LEUKOCYTE ESTERASE UR QL STRIP: ABNORMAL
MCH RBC QN AUTO: 29.8 PG (ref 26.5–33)
MCHC RBC AUTO-ENTMCNC: 32.3 G/DL (ref 31.5–36.5)
MCV RBC AUTO: 92 FL (ref 78–100)
MUCOUS THREADS #/AREA URNS LPF: PRESENT /LPF
NITRATE UR QL: POSITIVE
PH UR STRIP: 6.5 [PH] (ref 5–7)
PLATELET # BLD AUTO: 299 10E3/UL (ref 150–450)
POTASSIUM SERPL-SCNC: 4.4 MMOL/L (ref 3.4–5.3)
RBC # BLD AUTO: 4.36 10E6/UL (ref 3.8–5.2)
RBC URINE: 2 /HPF
RSV RNA SPEC NAA+PROBE: NEGATIVE
SARS-COV-2 RNA RESP QL NAA+PROBE: NEGATIVE
SODIUM SERPL-SCNC: 144 MMOL/L (ref 135–145)
SP GR UR STRIP: 1.02 (ref 1–1.03)
SQUAMOUS EPITHELIAL: 1 /HPF
TROPONIN T SERPL HS-MCNC: 27 NG/L
TROPONIN T SERPL HS-MCNC: 27 NG/L
UROBILINOGEN UR STRIP-MCNC: NORMAL MG/DL
WBC # BLD AUTO: 9.5 10E3/UL (ref 4–11)
WBC URINE: 25 /HPF

## 2025-06-19 PROCEDURE — 250N000013 HC RX MED GY IP 250 OP 250 PS 637: Performed by: HOSPITALIST

## 2025-06-19 PROCEDURE — 99222 1ST HOSP IP/OBS MODERATE 55: CPT | Performed by: HOSPITALIST

## 2025-06-19 PROCEDURE — 36415 COLL VENOUS BLD VENIPUNCTURE: CPT | Performed by: EMERGENCY MEDICINE

## 2025-06-19 PROCEDURE — 96365 THER/PROPH/DIAG IV INF INIT: CPT

## 2025-06-19 PROCEDURE — G0378 HOSPITAL OBSERVATION PER HR: HCPCS

## 2025-06-19 PROCEDURE — 83605 ASSAY OF LACTIC ACID: CPT | Performed by: EMERGENCY MEDICINE

## 2025-06-19 PROCEDURE — 258N000003 HC RX IP 258 OP 636: Performed by: EMERGENCY MEDICINE

## 2025-06-19 PROCEDURE — 87186 SC STD MICRODIL/AGAR DIL: CPT | Performed by: EMERGENCY MEDICINE

## 2025-06-19 PROCEDURE — 96361 HYDRATE IV INFUSION ADD-ON: CPT

## 2025-06-19 PROCEDURE — 93005 ELECTROCARDIOGRAM TRACING: CPT | Performed by: STUDENT IN AN ORGANIZED HEALTH CARE EDUCATION/TRAINING PROGRAM

## 2025-06-19 PROCEDURE — 82010 KETONE BODYS QUAN: CPT | Performed by: EMERGENCY MEDICINE

## 2025-06-19 PROCEDURE — 99291 CRITICAL CARE FIRST HOUR: CPT | Mod: 25

## 2025-06-19 PROCEDURE — 85027 COMPLETE CBC AUTOMATED: CPT | Performed by: EMERGENCY MEDICINE

## 2025-06-19 PROCEDURE — 80048 BASIC METABOLIC PNL TOTAL CA: CPT | Performed by: EMERGENCY MEDICINE

## 2025-06-19 PROCEDURE — 84484 ASSAY OF TROPONIN QUANT: CPT | Performed by: EMERGENCY MEDICINE

## 2025-06-19 PROCEDURE — 71045 X-RAY EXAM CHEST 1 VIEW: CPT

## 2025-06-19 PROCEDURE — 250N000011 HC RX IP 250 OP 636: Performed by: EMERGENCY MEDICINE

## 2025-06-19 PROCEDURE — 81001 URINALYSIS AUTO W/SCOPE: CPT | Performed by: EMERGENCY MEDICINE

## 2025-06-19 PROCEDURE — 87637 SARSCOV2&INF A&B&RSV AMP PRB: CPT | Performed by: EMERGENCY MEDICINE

## 2025-06-19 PROCEDURE — 87040 BLOOD CULTURE FOR BACTERIA: CPT | Performed by: EMERGENCY MEDICINE

## 2025-06-19 RX ORDER — MEMANTINE HYDROCHLORIDE 10 MG/1
10 TABLET ORAL 2 TIMES DAILY
COMMUNITY

## 2025-06-19 RX ORDER — AMOXICILLIN 250 MG
2 CAPSULE ORAL 2 TIMES DAILY PRN
Status: DISCONTINUED | OUTPATIENT
Start: 2025-06-19 | End: 2025-06-20 | Stop reason: HOSPADM

## 2025-06-19 RX ORDER — MEMANTINE HYDROCHLORIDE 10 MG/1
10 TABLET ORAL 2 TIMES DAILY
Status: DISCONTINUED | OUTPATIENT
Start: 2025-06-19 | End: 2025-06-20 | Stop reason: HOSPADM

## 2025-06-19 RX ORDER — AMOXICILLIN 250 MG
2 CAPSULE ORAL 2 TIMES DAILY
Status: DISCONTINUED | OUTPATIENT
Start: 2025-06-19 | End: 2025-06-20 | Stop reason: HOSPADM

## 2025-06-19 RX ORDER — AMOXICILLIN 250 MG
1 CAPSULE ORAL 2 TIMES DAILY
Status: DISCONTINUED | OUTPATIENT
Start: 2025-06-19 | End: 2025-06-20 | Stop reason: HOSPADM

## 2025-06-19 RX ORDER — PROCHLORPERAZINE MALEATE 5 MG/1
5 TABLET ORAL EVERY 6 HOURS PRN
Status: DISCONTINUED | OUTPATIENT
Start: 2025-06-19 | End: 2025-06-20 | Stop reason: HOSPADM

## 2025-06-19 RX ORDER — QUETIAPINE FUMARATE 25 MG/1
50 TABLET, FILM COATED ORAL AT BEDTIME
Status: DISCONTINUED | OUTPATIENT
Start: 2025-06-19 | End: 2025-06-20 | Stop reason: HOSPADM

## 2025-06-19 RX ORDER — CEFTRIAXONE 1 G/1
1 INJECTION, POWDER, FOR SOLUTION INTRAMUSCULAR; INTRAVENOUS EVERY 24 HOURS
Status: DISCONTINUED | OUTPATIENT
Start: 2025-06-20 | End: 2025-06-20 | Stop reason: HOSPADM

## 2025-06-19 RX ORDER — TRAZODONE HYDROCHLORIDE 50 MG/1
50 TABLET ORAL AT BEDTIME
Status: DISCONTINUED | OUTPATIENT
Start: 2025-06-19 | End: 2025-06-20 | Stop reason: HOSPADM

## 2025-06-19 RX ORDER — CELECOXIB 200 MG/1
200 CAPSULE ORAL 2 TIMES DAILY
Status: DISCONTINUED | OUTPATIENT
Start: 2025-06-19 | End: 2025-06-20 | Stop reason: HOSPADM

## 2025-06-19 RX ORDER — TRAZODONE HYDROCHLORIDE 50 MG/1
50 TABLET ORAL AT BEDTIME
COMMUNITY
Start: 2022-01-31

## 2025-06-19 RX ORDER — POLYETHYLENE GLYCOL 3350 17 G/17G
17 POWDER, FOR SOLUTION ORAL 2 TIMES DAILY PRN
Status: DISCONTINUED | OUTPATIENT
Start: 2025-06-19 | End: 2025-06-20 | Stop reason: HOSPADM

## 2025-06-19 RX ORDER — QUETIAPINE FUMARATE 25 MG/1
50 TABLET, FILM COATED ORAL AT BEDTIME
COMMUNITY

## 2025-06-19 RX ORDER — QUETIAPINE FUMARATE 25 MG/1
25 TABLET, FILM COATED ORAL 2 TIMES DAILY
Status: DISCONTINUED | OUTPATIENT
Start: 2025-06-19 | End: 2025-06-20 | Stop reason: HOSPADM

## 2025-06-19 RX ORDER — ASPIRIN 81 MG/1
81 TABLET ORAL DAILY
Status: DISCONTINUED | OUTPATIENT
Start: 2025-06-20 | End: 2025-06-20 | Stop reason: HOSPADM

## 2025-06-19 RX ORDER — VITAMIN B COMPLEX
50 TABLET ORAL DAILY
Status: DISCONTINUED | OUTPATIENT
Start: 2025-06-20 | End: 2025-06-20 | Stop reason: HOSPADM

## 2025-06-19 RX ORDER — ONDANSETRON 4 MG/1
4 TABLET, ORALLY DISINTEGRATING ORAL EVERY 6 HOURS PRN
Status: DISCONTINUED | OUTPATIENT
Start: 2025-06-19 | End: 2025-06-20 | Stop reason: HOSPADM

## 2025-06-19 RX ORDER — ONDANSETRON 2 MG/ML
4 INJECTION INTRAMUSCULAR; INTRAVENOUS EVERY 6 HOURS PRN
Status: DISCONTINUED | OUTPATIENT
Start: 2025-06-19 | End: 2025-06-20 | Stop reason: HOSPADM

## 2025-06-19 RX ORDER — QUETIAPINE FUMARATE 25 MG/1
25 TABLET, FILM COATED ORAL 2 TIMES DAILY
COMMUNITY

## 2025-06-19 RX ORDER — AMOXICILLIN 250 MG
1 CAPSULE ORAL 2 TIMES DAILY PRN
Status: DISCONTINUED | OUTPATIENT
Start: 2025-06-19 | End: 2025-06-20 | Stop reason: HOSPADM

## 2025-06-19 RX ORDER — CEFTRIAXONE 1 G/1
1 INJECTION, POWDER, FOR SOLUTION INTRAMUSCULAR; INTRAVENOUS ONCE
Status: COMPLETED | OUTPATIENT
Start: 2025-06-19 | End: 2025-06-19

## 2025-06-19 RX ADMIN — SENNOSIDES AND DOCUSATE SODIUM 1 TABLET: 50; 8.6 TABLET ORAL at 21:24

## 2025-06-19 RX ADMIN — CEFTRIAXONE SODIUM 1 G: 1 INJECTION, POWDER, FOR SOLUTION INTRAMUSCULAR; INTRAVENOUS at 13:17

## 2025-06-19 RX ADMIN — SODIUM CHLORIDE, SODIUM LACTATE, POTASSIUM CHLORIDE, AND CALCIUM CHLORIDE 2328 ML: .6; .31; .03; .02 INJECTION, SOLUTION INTRAVENOUS at 13:00

## 2025-06-19 RX ADMIN — TRAZODONE HYDROCHLORIDE 50 MG: 50 TABLET ORAL at 21:24

## 2025-06-19 RX ADMIN — QUETIAPINE FUMARATE 50 MG: 25 TABLET ORAL at 21:24

## 2025-06-19 RX ADMIN — MEMANTINE 10 MG: 10 TABLET ORAL at 21:24

## 2025-06-19 RX ADMIN — QUETIAPINE FUMARATE 25 MG: 25 TABLET ORAL at 14:31

## 2025-06-19 ASSESSMENT — ACTIVITIES OF DAILY LIVING (ADL)
ADLS_ACUITY_SCORE: 62
ADLS_ACUITY_SCORE: 41
ADLS_ACUITY_SCORE: 57
ADLS_ACUITY_SCORE: 57
ADLS_ACUITY_SCORE: 41
DEPENDENT_IADLS:: CLEANING;COOKING;LAUNDRY;SHOPPING;MEAL PREPARATION;MEDICATION MANAGEMENT;MONEY MANAGEMENT;TRANSPORTATION
ADLS_ACUITY_SCORE: 66
ADLS_ACUITY_SCORE: 57
ADLS_ACUITY_SCORE: 41
ADLS_ACUITY_SCORE: 62
ADLS_ACUITY_SCORE: 41
ADLS_ACUITY_SCORE: 41
ADLS_ACUITY_SCORE: 66
ADLS_ACUITY_SCORE: 41
ADLS_ACUITY_SCORE: 66

## 2025-06-19 NOTE — MEDICATION SCRIBE - ADMISSION MEDICATION HISTORY
Medication Scribe Admission Medication History    Admission medication history is complete. The information provided in this note is only as accurate as the sources available at the time of the update.    Information Source(s): Facility ([facility]/) medication list/MAR via N/A    Pertinent Information: Patient sent in with MAR. Called [facility] @[phone] and verified that patient has had morning meds. Removed any medications not listed on MAR    Changes made to PTA medication list:  Added: Quetiapine 50 qhs  Deleted: Donepezil, mag-oxide, mirtazapine, multivitamin, fish oil, exelon patch  Changed: Glucoosamine-marcellus liq to tabs    Allergies reviewed with patient and updates made in EHR: yes    Medication History Completed By: Chester Suh 6/19/2025 1:20 PM    PTA Med List   Medication Sig Last Dose/Taking    aspirin 81 MG EC tablet Take 81 mg by mouth daily 6/19/2025 Morning    celecoxib (CELEBREX) 200 MG capsule Take 1 capsule by mouth 2 times daily 6/19/2025 Morning    cholecalciferol (VITAMIN D3) 25 mcg (1000 units) capsule Take 2 capsules by mouth daily 6/19/2025 Morning    docusate sodium (COLACE) 50 MG capsule Take 50 mg by mouth 2 times daily as needed for constipation Taking As Needed    Glucosamine-Chondroitin 750-600 MG CHEW Take 1 tablet by mouth 2 times daily. 6/19/2025 Morning    melatonin 3 MG tablet Take 3 mg by mouth at bedtime. 6/18/2025 Bedtime    memantine (NAMENDA) 10 MG tablet Take 10 mg by mouth 2 times daily. 6/19/2025 Morning    QUEtiapine (SEROQUEL) 25 MG tablet Take 50 mg by mouth at bedtime. Taking    QUEtiapine (SEROQUEL) 25 MG tablet Take 1 tablet (25 mg) by mouth 2 times daily **FOLLOW UP NEEDED FOR REFILLS** 6/19/2025 Morning    traZODone (DESYREL) 50 MG tablet Take 50 mg by mouth at bedtime. Taking

## 2025-06-19 NOTE — ED NOTES
[facility] ED Handoff Report    ED Chief Complaint: altered mental status, generalized weakness    ED Diagnosis:  (N39.0) Urinary tract infection without hematuria, site unspecified    (A41.9,  R65.20) Severe sepsis (H)       PMH:    Past Medical History:   Diagnosis Date    Dementia (H)     GERD (gastroesophageal reflux disease)     Hyperlipidemia         Code Status:  No CPR- Do NOT Intubate     Falls Risk: Yes Band: Applied    Current Living Situation/Residence: lives in a skilled nursing facility     Elimination Status: Continent: wears briefs     Activity Level: SBA    Patients Preferred Language:  English     Needed: No    Vital Signs:  BP (!) 151/70   Pulse 67   Temp 98.2  F (36.8  C) (Oral)   Resp 24   LMP  (LMP Unknown)   SpO2 99%      Cardiac Rhythm: Normal Sinus    Pain Score: Patient is unable to quantify.    Is the Patient Confused:  No    Last Food or Drink: 06/19/25     Focused Assessment:  Patient lives in memory care unit for alzheimer's, is non verbal at baseline. Staff reported that this morning patient was having difficulty following commands and needed more assistance to ambulate/ transfer    Tests Performed: Done: Labs and Imaging    Treatments Provided:  US IV via the PICC team, IV fluids, antibiotics    Family Dynamics/Concerns: No    Family Updated On Visitor Policy: No    Plan of Care Communicated to Family: Yes    Who Was Updated about Plan of Care:     Belongings Checklist Done and Signed by Patient: No    Belongings Sent with Patient: clothing    Medications sent with patient: none    Covid: asymptomatic , negative      RN: Katie Palafox RN 6/19/2025 3:14 PM

## 2025-06-19 NOTE — ED NOTES
[facility]  Expected date: 6/19/25  Expected time: 9:44 AM  Means of arrival: Ambulance  Comments:  [facility]  69F  Wkns, diaphoretic, 12 ld neg

## 2025-06-19 NOTE — CONSULTS
Care Management Initial Consult    General Information  Assessment completed with: Spouse or significant otherJeanmarie  Type of CM/SW Visit: Initial Assessment    Primary Care Provider verified and updated as needed:     Readmission within the last 30 days: no previous admission in last 30 days      Reason for Consult: discharge planning  Advance Care Planning:            Communication Assessment  Patient's communication style: spoken language (English or Bilingual)             Cognitive  Cognitive/Neuro/Behavioral: WDL, .WDL except, speech  Level of Consciousness: alert  Arousal Level: opens eyes spontaneously     Mood/Behavior: flat affect     Speech: other (see comments) (non verbal at baseline)    Living Environment:   People in home: facility resident     Current living Arrangements: assisted living  Name of Facility: [facility]   Able to return to prior arrangements: yes       Family/Social Support:  Care provided by: other (see comments) (facility staff)  Provides care for: no one, unable/limited ability to care for self  Marital Status:   Support system: , Children  Jeanmarie       Description of Support System: Supportive, Involved    Support Assessment: Adequate family and caregiver support, Adequate social supports    Current Resources:   Patient receiving home care services: No        Community Resources: None  Equipment currently used at home: none  Supplies currently used at home:      Employment/Financial:  Employment Status:          Financial Concerns: none           Does the patient's insurance plan have a 3 day qualifying hospital stay waiver?  Yes     Which insurance plan 3 day waiver is available? Alternative insurance waiver    Will the waiver be used for post-acute placement? No    Lifestyle & Psychosocial Needs:  Social Drivers of Health     Food Insecurity: Not on file   Depression: Not on file   Housing Stability: Not on file   Tobacco Use: Unknown  (10/17/2023)    Received from [employer]    Patient History     Smoking Tobacco Use: Never     Smokeless Tobacco Use: Unknown     Passive Exposure: Not on file   Financial Resource Strain: Not on file   Alcohol Use: Not on file   Transportation Needs: Not on file   Physical Activity: Not on file   Interpersonal Safety: Not on file   Stress: Not on file   Social Connections: Not on file   Health Literacy: Not on file       Functional Status:  Prior to admission patient needed assistance:   Dependent ADLs:: Bathing, Dressing, Eating, Grooming, Toileting  Dependent IADLs:: Cleaning, Cooking, Laundry, Shopping, Meal Preparation, Medication Management, Money Management, Transportation          Discussed  Partnership in Safe Discharge Planning  document with patient/family: No    Additional Information:  SW met with pts , Jeanmarie, at pts bedside. Pt awake but not alert or participative in assessment. Jeanmarie confirms pt lives at the [facility]. He reports she has been there about a year and a half. He reports that she is independent with ambulation and spends a lot of her day walking in the halls at the facility. He reports that she gets assistance with all I/ADLs at the facility including bathing, dressing, and toileting. Jeanmarie anticipating pt will return to the [facility] when medically ready for discharge. He reports understanding that SW will coordinate that return with the nursing team at the facility. He requests that a ride be arranged for pt when she is ready for discharge as she is not able to coordinate the movements of her body to get into a car. YAN informed Jeanmarie that pt would likely need stretcher transport and that this would be billed to insurance but coverage is unknown. Jeanmarie reports understanding and agreement. He denies other needs or questions for SW at this time.    YAN placed call to [facility] ([phone], [phone]) and spoke with  nurse, Charleen. She confirms the above information from Jeanmarie. She reports that ideally pt would return during the week but she is working on Saturday until 11:00 and they could accept her back on Saturday if she has the orders before 11 AM. She requests to be updated tomorrow on the pts status and plan on her cell phone at [phone].     Next Steps: coordinate discharge with [facility], arrange stretcher transport, keep Jeanmarie updated on the plan    VIVIAN Rocha

## 2025-06-19 NOTE — ED PROVIDER NOTES
EMERGENCY DEPARTMENT ENCOUNTER      NAME: Jasmyne Madison  AGE: 69 year old female  YOB: 1955  MRN: [id number]  EVALUATION DATE & TIME: 6/19/2025  9:52 AM    PCP: Carmen Rodriguez    ED PROVIDER: Johnny Martinez M.D.      Chief Complaint   Patient presents with    Generalized Weakness    Altered Mental Status         FINAL IMPRESSION:  1. Urinary tract infection without hematuria, site unspecified    2. Severe sepsis (H)          ED COURSE & MEDICAL DECISION MAKING:    Pertinent Labs & Imaging studies reviewed below.  All EKGs below represent my independent interpretation.   ED Course as of 06/19/25 1728   Thu Jun 19, 2025   1021 Patient is a 69-year-old woman with history of significant dementia, nonverbal at baseline, resides at a memory care facility.  She was found by care staff this morning to be more tired, less responsive than usual.  She also was diaphoretic.  She was brought to emergency department by medics, glucose was 155.  On arrival here she has normal blood pressure, temperature and heart rate.  She is oxygenating on percent.  She does seem to be breathing slightly more effort than expected.  Unclear if this is due to respiratory infection, discomfort, acidosis.  Screening x-ray, lab work ordered.  EKG ordered.  No evidence of trauma to head or neck.  She is awake and seems to be able to follow some instructions. I also reviewed recent ED visit on 2/25/2025 at [facility].  She was seen for dehydration and hypernatremia at that time.   1021 EKG shows sinus rhythm with a rate of 65.  No acute ischemic ST elevation or depression.  Incomplete right bundle branch block.  Normal axis, normal intervals.  When compared to prior EKG on February 25, 2025, there is no significant change.  Impression: Sinus rhythm with incomplete right bundle branch block   1035 XR Chest Port 1 View  Based on my interpretation of the one-view portable x-ray, no pulmonary infiltrates   1122 Influenza A/B, RSV  and SARS-CoV2 PCR (COVID-19) Nasopharyngeal  Negative viral panel   1126 WBC: 9.5   1126 Hemoglobin: 13.0   1142 Troponin T, High Sensitivity(!): 27  Minimally elevated, baseline from 9 months ago was normal.  Will repeat in 2 hours   1143 Basic metabolic panel(!)  Mild increase in a gap metabolic acidosis.  Ketones, lactate added on   1222 UA with Microscopic reflex to Culture(!)  UTI   1222 Lactic Acid Whole Blood with 1X Repeat in 2 HR when >2(!)  Concern for severe sepsis.  Will treat with IV antibiotics.  Rocephin ordered.  No history of Pseudomonas that I can see.  Blood cultures ordered.  30 cc/kg bolus of crystalloid   1325 Troponin T, High Sensitivity(!): 27  unchanged   1325 Ketone Quantitative: 0.25   Patient is lactic acid trended downwards after fluid bolus.  She was never hypotensive, not in septic shock.  She was admitted to the hospitalist for management of her UTI.    Additional ED Course timestamps entered by medical scribe:  10:12 AM Introduced myself to the patient, obtained history of present illness, and performed initial physical exam at this time.    12:31 PM I reevaluated the patient. Discussed plan for admission at this time.   1:04 PM I spoke with Hospitalist, Dr. Pillai, about patient's case. They accept patient for admission at this time.     Medical Decision Making  I obtained history from Family Member/Significant Other  Care impacted by Dementia  Admit.    MIPS (CTPE, Dental pain, Greco, Sinusitis, Asthma/COPD, Head Trauma): Not Applicable    SEPSIS: The patient has signs of sepsis   Sepsis ED evaluation   The patient has signs of sepsis as evidenced by:  1. Presence of 2 SIRS criteria, suspected infection, AND  2. Organ dysfunction: Lactic Acidosis with value >2.0 due to sepsis    Sepsis Care Initiation: Starting at 12:14 PM on 06/19/25, until specified. Prior to this documentation, sepsis, severe sepsis, or septic shock was NOT thought to be a significant cause of illness. This  order represents the first time infection was seriously considered to be affecting the patient.    Lactic Acid Results:  Recent Labs   Lab Test 06/19/25  1425 06/19/25  1214   LACT 3.1* 3.4*       3 Hour Bundle 6 Hour Bundle (Reassessment)   Blood Cultures before IV Antibiotics: Yes  Antibiotics given: see below  Prehospital fluid volume (mL):                     Total fluids given (ED +Pre-hospital):  Full 30 mL/kg bolus given based on weight: 2,510 mL   Repeat Lactic Acid Level: Ordered by reflex for 2 hours after initial lactic acid collection.  Vasopressors: MAP>65 after initial IVF bolus, will continue to monitor fluid status and vital signs.  Repeat perfusion exam: I attest to having performed a repeat sepsis exam and assessment of perfusion at 14:25 PM.   BMI Readings from Last 1 Encounters:   06/19/25 38.57 kg/m        Anti-infectives (From admission through now)      Start     Dose/Rate Route Frequency Ordered Stop    06/19/25 1230  cefTRIAXone (ROCEPHIN) 1 g vial to attach to  mL bag for ADULTS or NS 50 mL bag for PEDS         1 g  over 15-30 Minutes Intravenous ONCE 06/19/25 1225 06/19/25 1400                  30 minutes of critical care time for severe sepsis requiring IV fluid bolus, IV antibiotics, admission. This excludes time spent performing interventions or procedures.    MEDICATIONS GIVEN IN THE EMERGENCY:  Medications   memantine (NAMENDA) tablet 10 mg (has no administration in time range)   QUEtiapine (SEROquel) tablet 50 mg (has no administration in time range)   QUEtiapine (SEROquel) tablet 25 mg (25 mg Oral $Given 6/19/25 1431)   traZODone (DESYREL) tablet 50 mg (has no administration in time range)   docusate sodium (COLACE) capsule 50 mg (has no administration in time range)   Vitamin D3 (CHOLECALCIFEROL) tablet 50 mcg (has no administration in time range)   celecoxib (celeBREX) capsule 200 mg ( Oral Automatically Held 6/22/25 2000)   aspirin EC tablet 81 mg (has no administration in  time range)   senna-docusate (SENOKOT-S/PERICOLACE) 8.6-50 MG per tablet 1 tablet (has no administration in time range)     Or   senna-docusate (SENOKOT-S/PERICOLACE) 8.6-50 MG per tablet 2 tablet (has no administration in time range)   ondansetron (ZOFRAN ODT) ODT tab 4 mg (has no administration in time range)     Or   ondansetron (ZOFRAN) injection 4 mg (has no administration in time range)   prochlorperazine (COMPAZINE) injection 5 mg (has no administration in time range)     Or   prochlorperazine (COMPAZINE) tablet 5 mg (has no administration in time range)   senna-docusate (SENOKOT-S/PERICOLACE) 8.6-50 MG per tablet 1 tablet (has no administration in time range)     Or   senna-docusate (SENOKOT-S/PERICOLACE) 8.6-50 MG per tablet 2 tablet (has no administration in time range)   polyethylene glycol (MIRALAX) Packet 17 g (has no administration in time range)   cefTRIAXone (ROCEPHIN) 1 g vial to attach to  mL bag for ADULTS or NS 50 mL bag for PEDS (has no administration in time range)   lactated ringers BOLUS 2,328 mL (0 mLs Intravenous Stopped 6/19/25 1520)   cefTRIAXone (ROCEPHIN) 1 g vial to attach to  mL bag for ADULTS or NS 50 mL bag for PEDS (0 g Intravenous Stopped 6/19/25 1400)         NEW PRESCRIPTIONS STARTED AT TODAY'S ER VISIT  Current Discharge Medication List             =================================================================    HPI    Jasmyne Madison is a 69 year old female who presents to this ED for evaluation of generalized weakness and altered mental status.     History limited secondary to dementia.     Per EMS: Patient arrives from memory care. Staff at the memory care center noticed she was not at her baseline and was requiring more assistance this morning. She was following their commands. She was febrile and diaphoretic this morning per staff. Blood glucose of 155.     Per chart review:  2/25/2025 Patient was seen at [facility] ED for evaluation of shaking  and tremors. Prior to the ED she had an episode of unresponsiveness. Head CT unremarkable. History of dementia, but appeared to be at her baseline neurologically per exam. Labs notable for a mildly elevated sodium of 147. Troponin of 9. ECG unremarkable. Determined that patient's symptoms were concurrent with dehydration. Discharged home in stable condition with no new medications.     VITALS:  /64 (BP Location: Left arm)   Pulse 75   Temp 98.2  F (36.8  C) (Axillary)   Resp 28   Wt 83.7 kg (184 lb 8.4 oz)   LMP  (LMP Unknown)   SpO2 93%   BMI 38.57 kg/m      PHYSICAL EXAM    Constitutional: Awake and attentive.   HENT: Atraumatic, mucous membranes moist, nose normal. Neck- Supple, gross ROM intact.   Eyes: Pupils mid-range, conjunctiva without injection, no discharge.   Respiratory: Clear to auscultation bilaterally, no respiratory distress, no wheezing, speaks full sentences easily. No cough. Mildly increased work of breathing.   Cardiovascular: Normal heart rate, regular rhythm, no murmurs.   GI: Soft, no tenderness to deep palpation in all quadrants, no masses.  Musculoskeletal: Moving all 4 extremities intentionally and without pain. No obvious deformity.  Skin: Warm, dry, no rash.  Neurologic:Tracks my questions. Able to move all extremities weakly on command.   Psychiatric: Affect normal, cooperative.          I, Enriqueta Asif am serving as a scribe to document services personally performed by Dr. Johnny Martinez based on my observation and the provider's statements to me. I, Johnny Martinez MD attest that Enriqueta Asif is acting in a scribe capacity, has observed my performance of the services and has documented them in accordance with my direction.    Johnny Martinez M.D.  Emergency Medicine  [facility] EMERGENCY DEPARTMENT  [address] [address] [phone]  Dept: [phone]      Johnny Martinez,  MD  06/19/25 5609

## 2025-06-19 NOTE — H&P
[facility]    History and Physical - Hospitalist Service       Date of Admission:  6/19/2025    Assessment & Plan      Jasmyne Madison is a 69 year old female admitted on 6/19/2025. She is being admitted for acute encephalopathy, UTI and dehydration.    #Acute metabolic encephalopathy  #Chronic severe dementia  - Patient is nonverbal at baseline, only responds to voices with head turning  - Consider dehydration versus UTI versus natural progression of dementia  - Supportive measures treat underlying conditions  - Resume usual , Seroquel, trazodoneNamenda  - Delirium precautions  - Initially will try and avoid one-to-one attendant, patient reorients easily if you offer her a hand rather than touch her arm, enjoys frequent walking so may benefit from regular walks while admitted until she can return to memory care    #UTI  - Unable to offer meaningful history, urinalysis grossly abnormal, follow-up cultures  - Continue Rocephin until further    #Dehydration  #Lactic acidosis  #Elevated anion gap metabolic acidosis  - Fitting with dehydration, patient's  reports occasionally misses meals due to distraction and wandering  - Support with IV fluids, encourage p.o. intake    #Constipation  - Bowel regimen ordered    #Healthcare maintenance  - Resume usual aspirin, no apparent history of vascular disease           Observation Goals: -diagnostic tests and consults completed and resulted, -vital signs normal or at patient baseline, -infection is improving, -safe disposition plan has been identified, Nurse to notify provider when observation goals have been met and patient is ready for discharge.  Diet: Regular Diet Adult    DVT Prophylaxis: Pneumatic Compression Devices  Greco Catheter: Not present  Lines: None     Cardiac Monitoring: None  Code Status: No CPR- Do NOT Intubate      Clinically Significant Risk Factors Present on Admission          # Hyperchloremia: Highest Cl = 109 mmol/L in last 2  days, will monitor as appropriate            # Drug Induced Platelet Defect: home medication list includes an antiplatelet medication       # Dementia: noted on problem list                  Disposition Plan     Medically Ready for Discharge: Anticipated Tomorrow           Gonsalo Pillai MD  Hospitalist Service  [facility]  Securely message with [employer] (more info)  Text page via [employer] Paging/Directory     ______________________________________________________________________    Chief Complaint   Confusion    History is obtained from the patient     History of Present Illness   Jasmyne Madison is a 69 year old female who presents to the ER from her memory care center for change in baseline interaction and chills.  She has background dementia and is nonverbal at baseline.  She apparently had some worsening of her usual interaction, was more listless at her memory care facility and was noted to have some chills.  She was brought in for evaluation.  She is unable to meaningfully provide any history.  Her  is at the bedside and states that she has variable intake at her care center, wonders about dehydration, describes her getting distracted and wandering during mealtime.  He also notes some constipation in recent weeks.      Past Medical History    Past Medical History:   Diagnosis Date    Dementia (H)     GERD (gastroesophageal reflux disease)     Hyperlipidemia        Past Surgical History   Past Surgical History:   Procedure Laterality Date    ABDOMEN SURGERY      GYN SURGERY      MAMMOPLASTY REDUCTION  2007       Prior to Admission Medications   Prior to Admission Medications   Prescriptions Last Dose Informant Patient Reported? Taking?   Glucosamine-Chondroitin 750-600 MG CHEW 6/19/2025 Morning  Yes Yes   Sig: Take 1 tablet by mouth 2 times daily.   QUEtiapine (SEROQUEL) 25 MG tablet 6/18/2025 Bedtime  Yes Yes   Sig: Take 50 mg by mouth at bedtime.   QUEtiapine (SEROQUEL) 25 MG  tablet 6/19/2025 Morning  Yes Yes   Sig: Take 25 mg by mouth 2 times daily. Dosing at 8am and 2pm along with bedtime dose at 8pm   aspirin 81 MG EC tablet 6/19/2025 Morning  Yes Yes   Sig: Take 81 mg by mouth daily   celecoxib (CELEBREX) 200 MG capsule 6/19/2025 Morning  Yes Yes   Sig: Take 1 capsule by mouth 2 times daily   cholecalciferol (VITAMIN D3) 25 mcg (1000 units) capsule 6/19/2025 Morning  Yes Yes   Sig: Take 2 capsules by mouth daily   docusate sodium (COLACE) 50 MG capsule   Yes Yes   Sig: Take 50 mg by mouth 2 times daily as needed for constipation   melatonin 3 MG tablet 6/18/2025 Bedtime  Yes Yes   Sig: Take 3 mg by mouth at bedtime.   memantine (NAMENDA) 10 MG tablet 6/19/2025 Morning  Yes Yes   Sig: Take 10 mg by mouth 2 times daily.   traZODone (DESYREL) 50 MG tablet 6/18/2025 Evening  Yes Yes   Sig: Take 50 mg by mouth at bedtime.      Facility-Administered Medications: None           Physical Exam   Vital Signs: Temp: 98.2  F (36.8  C) Temp src: Oral BP: (!) 151/70 Pulse: 67   Resp: 24 SpO2: 99 % O2 Device: None (Room air)    Weight: 0 lbs 0 oz    Alert, no distress, membranes moist, heart rate regular, lungs clear, abdomen soft, no leg edema, abnormal affect    Medical Decision Making       56 MINUTES SPENT BY ME on the date of service doing chart review, history, exam, documentation & further activities per the note.  NOTE(S)/MEDICAL RECORDS REVIEWED over the past 24 hours: Vitals, labs, new imaging, documentation and medication administrations      Data     I have personally reviewed the following data over the past 24 hrs:    9.5  \   13.0   / 299     144 109 (H) 30.9 (H) /  94   4.4 18 (L) 0.65 \     Trop: 27 (H) BNP: N/A     Procal: N/A CRP: N/A Lactic Acid: 3.4 (H)         Imaging results reviewed over the past 24 hrs:   Recent Results (from the past 24 hours)   XR Chest Port 1 View    Narrative    EXAM: XR CHEST PORT 1 VIEW  LOCATION: [facility]  DATE:  6/19/2025    INDICATION: SOB  COMPARISON: None.      Impression    IMPRESSION: Lungs are clear. Heart and pulmonary vascularity are normal. No signs of acute disease.

## 2025-06-19 NOTE — ED TRIAGE NOTES
Patient coming from memory care unit, staff noted a change in patients mentation this morning. She was not able to follow commands and appeared weak and needed more assistance to ambulate. Patient is non verbal at baseline. Staff also noted patient was diaphoretic during this time as well     Triage Assessment (Adult)       Row Name 06/19/25 0957          Triage Assessment    Airway WDL WDL        Respiratory WDL    Respiratory WDL WDL        Skin Circulation/Temperature WDL    Skin Circulation/Temperature WDL WDL        Cardiac WDL    Cardiac WDL WDL        Peripheral/Neurovascular WDL    Peripheral Neurovascular WDL WDL        Cognitive/Neuro/Behavioral WDL    Cognitive/Neuro/Behavioral WDL WDL;X;speech     Level of Consciousness alert     Arousal Level opens eyes spontaneously     Speech other (see comments)  non verbal at baseline     Mood/Behavior flat affect        Pupils (CN II)    Pupil PERRLA yes     Pupil Size Left 2 mm     Pupil Size Right 2 mm

## 2025-06-20 VITALS
BODY MASS INDEX: 38.57 KG/M2 | HEART RATE: 73 BPM | TEMPERATURE: 97.7 F | SYSTOLIC BLOOD PRESSURE: 120 MMHG | OXYGEN SATURATION: 100 % | RESPIRATION RATE: 18 BRPM | WEIGHT: 184.53 LBS | DIASTOLIC BLOOD PRESSURE: 71 MMHG

## 2025-06-20 LAB
ALBUMIN SERPL BCG-MCNC: 3.3 G/DL (ref 3.5–5.2)
ANION GAP SERPL CALCULATED.3IONS-SCNC: 9 MMOL/L (ref 7–15)
BACTERIA UR CULT: ABNORMAL
BUN SERPL-MCNC: 22.2 MG/DL (ref 8–23)
CALCIUM SERPL-MCNC: 8.8 MG/DL (ref 8.8–10.4)
CHLORIDE SERPL-SCNC: 109 MMOL/L (ref 98–107)
CREAT SERPL-MCNC: 0.57 MG/DL (ref 0.51–0.95)
EGFRCR SERPLBLD CKD-EPI 2021: >90 ML/MIN/1.73M2
GLUCOSE SERPL-MCNC: 110 MG/DL (ref 70–99)
HCO3 SERPL-SCNC: 21 MMOL/L (ref 22–29)
HOLD SPECIMEN: NORMAL
LACTATE SERPL-SCNC: 1 MMOL/L (ref 0.7–2)
PHOSPHATE SERPL-MCNC: 4.5 MG/DL (ref 2.5–4.5)
POTASSIUM SERPL-SCNC: 3.6 MMOL/L (ref 3.4–5.3)
SODIUM SERPL-SCNC: 139 MMOL/L (ref 135–145)

## 2025-06-20 PROCEDURE — 258N000003 HC RX IP 258 OP 636

## 2025-06-20 PROCEDURE — G0378 HOSPITAL OBSERVATION PER HR: HCPCS

## 2025-06-20 PROCEDURE — 99207 PR APP CREDIT; MD BILLING SHARED VISIT: CPT | Mod: FS

## 2025-06-20 PROCEDURE — 36415 COLL VENOUS BLD VENIPUNCTURE: CPT | Performed by: HOSPITALIST

## 2025-06-20 PROCEDURE — 83605 ASSAY OF LACTIC ACID: CPT

## 2025-06-20 PROCEDURE — 36415 COLL VENOUS BLD VENIPUNCTURE: CPT

## 2025-06-20 PROCEDURE — 99238 HOSP IP/OBS DSCHRG MGMT 30/<: CPT | Performed by: INTERNAL MEDICINE

## 2025-06-20 PROCEDURE — 250N000013 HC RX MED GY IP 250 OP 250 PS 637: Performed by: HOSPITALIST

## 2025-06-20 PROCEDURE — 80069 RENAL FUNCTION PANEL: CPT | Performed by: HOSPITALIST

## 2025-06-20 RX ORDER — CEPHALEXIN 250 MG/5ML
500 POWDER, FOR SUSPENSION ORAL 2 TIMES DAILY
Qty: 200 ML | Refills: 0 | Status: SHIPPED | OUTPATIENT
Start: 2025-06-20 | End: 2025-06-30

## 2025-06-20 RX ORDER — SODIUM CHLORIDE 9 MG/ML
INJECTION, SOLUTION INTRAVENOUS CONTINUOUS
Status: DISCONTINUED | OUTPATIENT
Start: 2025-06-20 | End: 2025-06-20 | Stop reason: HOSPADM

## 2025-06-20 RX ADMIN — QUETIAPINE FUMARATE 25 MG: 25 TABLET ORAL at 08:10

## 2025-06-20 RX ADMIN — ASPIRIN 81 MG: 81 TABLET, COATED ORAL at 08:10

## 2025-06-20 RX ADMIN — SENNOSIDES AND DOCUSATE SODIUM 1 TABLET: 50; 8.6 TABLET ORAL at 08:10

## 2025-06-20 RX ADMIN — SODIUM CHLORIDE: 0.9 INJECTION, SOLUTION INTRAVENOUS at 07:20

## 2025-06-20 RX ADMIN — Medication 50 MCG: at 08:10

## 2025-06-20 RX ADMIN — MEMANTINE 10 MG: 10 TABLET ORAL at 08:11

## 2025-06-20 ASSESSMENT — ACTIVITIES OF DAILY LIVING (ADL)
ADLS_ACUITY_SCORE: 62
ADLS_ACUITY_SCORE: 64
ADLS_ACUITY_SCORE: 62
ADLS_ACUITY_SCORE: 64
ADLS_ACUITY_SCORE: 64
ADLS_ACUITY_SCORE: 62

## 2025-06-20 ASSESSMENT — COLUMBIA-SUICIDE SEVERITY RATING SCALE - C-SSRS: IS THE PATIENT NOT ABLE TO COMPLETE C-SSRS: UNABLE TO VERBALIZE

## 2025-06-20 NOTE — PLAN OF CARE
"PRIMARY DIAGNOSIS: \"GENERIC\" NURSING  OUTPATIENT/OBSERVATION GOALS TO BE MET BEFORE DISCHARGE:  ADLs back to baseline: No    Activity and level of assistance: not out of bed    Pain status: Pain free.    Return to near baseline physical activity: No     Discharge Planner Nurse   Safe discharge environment identified: Yes  Barriers to discharge: Yes       Entered by: Sandra Younger RN 06/20/2025 6:05 AM     Please review provider order for any additional goals.   Nurse to notify provider when observation goals have been met and patient is ready for discharge.Goal Outcome Evaluation:                            "

## 2025-06-20 NOTE — DISCHARGE SUMMARY
[facility]  Hospitalist Discharge Summary      Date of Admission:  6/19/2025  Date of Discharge:  6/20/2025  Discharging Provider: Kayla Jaquez PA-C  Discharge Service: Hospitalist Service    Discharge Diagnoses   Acute Metabolic Encephalopathy  Chronic Severe Dementia  UTI   Dehydration / Lactic Acidosis / Elevated AGMA    Clinically Significant Risk Factors          Follow-ups Needed After Discharge   Follow-up Appointments       Follow Up and recommended labs and tests      Follow up with primary care provider in 2 weeks for hospital follow-up              Unresulted Labs Ordered in the Past 30 Days of this Admission       Date and Time Order Name Status Description    6/19/2025 12:25 PM Blood Culture Peripheral blood (BC) Arm, Right Preliminary     6/19/2025 12:25 PM Blood Culture Peripheral blood (BC) Arm, Right Preliminary     6/19/2025 12:21 PM Urine Culture Preliminary           Discharge Disposition   Discharged to back home to Memory Care   Condition at discharge: Stable    Hospital Course   Jasmyne Madison is a 69 year old female with PMHx of severe dementia, non-verbal at baseline who was admitted on 6/19/2025. She was brought to the ER for evaluation of change in baseline interaction and chills. She apparently had some worsening of her usual interaction (was walking less, not smiling), was more listless at her memory care facility and was noted to have some chills. She was brought in for evaluation. She was found to have UTI, started on ceftriaxone. UC with growth of E. Coli, will be discharged on Keflex and to return back to her memory care facility. Discharge and hospital stay discussed with POA/.      Acute Metabolic Encephalopathy  Chronic Severe Dementia  She apparently had some worsening of her usual interaction, was more listless at her memory care facility and was noted to have some chills. She was brought in for evaluation.   Patient is nonverbal at baseline, only  responds to voices with head turning  Consider dehydration versus UTI versus natural progression of dementia  -- Supportive measures treat underlying conditions  -- Resume usual Seroquel, trazodone, memantine  -- Delirium precautions - plan to discharge back to memory care      UTI  Unable to offer meaningful history  -- Urinalysis grossly abnormal  -- On empiric IV ceftriaxone (6/19 - 6/20)   -- Ucx with growth of E. Coli   -- Discharging on Keflex - follow-up sensitivities and tailor abx as indicated  -- With lactic acidosis as below, considered sepsis but without white count, afebrile, no hypotension seems less likely - follow-up Bcx ; NGTD      Dehydration  Lactic Acidosis (resolved)  Elevated Anion Gap Metabolic Acidosis (resolved)  Fitting with dehydration, patient's  reports occasionally misses meals due to distraction and wandering  Also reviewed recent ED visit on 2/25/2025 at [facility]. She was seen for dehydration and hypernatremia at that time.   -- Support with IV fluids, encourage p.o. intake at discharge     Constipation  -- Continue PTA colace, can titrate up with Miralax/Senokot outpatient as needed     Healthcare maintenance  -- Resume usual aspirin, no apparent history of vascular disease    Consultations This Hospital Stay   CARE MANAGEMENT / SOCIAL WORK IP CONSULT  PHARMACY IP CONSULT    Code Status   No CPR- Do NOT Intubate    Time Spent on this Encounter   I, Kayla Jaquez PA-C, personally saw the patient today and spent greater than 30 minutes discharging this patient.     This case was discussed with the Attending Physician, Dr. Rhett Beach, who independently met with and assessed the patient and who is in agreement with the disposition and discharge.    Kayla Jaquez PA-C  [facility]TEND[facility]ORT STAY  [address] [place] [id number]  Phone: [phone]  Fax:  [phone]  ______________________________________________________________________    Physical Exam   Vital Signs: Temp: 97.7  F (36.5  C) Temp src: Oral BP: 120/71 Pulse: 73   Resp: 18 SpO2: 100 % O2 Device: None (Room air)    Weight: 184 lbs 8.4 oz    Constitutional: awake, alert, no apparent distress, and appears stated age  Eyes: Lids and lashes normal, extra ocular muscles intact, sclera clear, conjunctiva normal  Respiratory: No increased work of breathing, no accessory muscle use, clear to auscultation bilaterally, no crackles or wheezing  Cardiovascular: Regular rate and rhythm, normal S1 and S2  GI: Soft, non-distended, normal bowel sounds, no masses palpated, no hepatosplenomegaly  Skin: Normal skin color, texture, turgor. No rashes and no jaundice  Musculoskeletal: no lower extremity pitting edema present.   Neurologic / Neuropsychiatric: Awake, alert. Non-verbal at baseline but tracks and makes eye contact.       Primary Care Physician   Mara Bolton    Discharge Orders      General info for SNF    Length of Stay Estimate: Short Term Care: Estimated # of Days <30  Condition at Discharge: Improving  Level of care:skilled   Rehabilitation Potential: Excellent  Admission H&P remains valid and up-to-date: Yes  Recent Chemotherapy: N/A  Use Nursing Home Standing Orders: Yes     Reason for your hospital stay    Acute Encephalopathy / Dementia   Urinary Tract Infection   Dehydration / Lactic Acidosis     Follow Up and recommended labs and tests    Follow up with primary care provider in 2 weeks for hospital follow-up     Activity - Up with nursing assistance     Fall precautions     Diet    Follow this diet upon discharge: Pureed Diet (level 4) Thin Liquids (level 0)       Significant Results and Procedures   Most Recent 3 CBC's:  Recent Labs   Lab Test 06/19/25  1101 02/25/25  2217 05/15/24  1046   WBC 9.5 6.8 5.0   HGB 13.0 12.3 12.4   MCV 92 95 96    226 260     Most Recent 3 BMP's:  Recent Labs    Lab Test 06/20/25  0532 06/19/25  1101 03/05/25  1438    144 139   POTASSIUM 3.6 4.4 4.1   CHLORIDE 109* 109* 105   CO2 21* 18* 22   BUN 22.2 30.9* 30.7*   CR 0.57 0.65 0.77   ANIONGAP 9 17* 12   DIANE 8.8 10.2 9.5   * 94 144*     Most Recent Urinalysis:  Recent Labs   Lab Test 06/19/25  1207   COLOR Light Yellow   APPEARANCE Clear   URINEGLC Negative   URINEBILI Negative   URINEKETONE Trace*   SG 1.019   UBLD Negative   URINEPH 6.5   PROTEIN Negative   NITRITE Positive*   LEUKEST 250 Yvette/uL*   RBCU 2   WBCU 25*   ,   Results for orders placed or performed during the hospital encounter of 06/19/25   XR Chest Port 1 View    Narrative    EXAM: XR CHEST PORT 1 VIEW  LOCATION: [facility]  DATE: 6/19/2025    INDICATION: SOB  COMPARISON: None.      Impression    IMPRESSION: Lungs are clear. Heart and pulmonary vascularity are normal. No signs of acute disease.       Discharge Medications      Review of your medicines        START taking        Dose / Directions   cephALEXin 250 MG/5ML suspension  Commonly known as: KEFLEX      Dose: 500 mg  Take 10 mLs (500 mg) by mouth 2 times daily for 10 days.  Quantity: 200 mL  Refills: 0            CONTINUE these medicines which have NOT CHANGED        Dose / Directions   aspirin 81 MG EC tablet      Dose: 81 mg  Take 81 mg by mouth daily  Refills: 0     celecoxib 200 MG capsule  Commonly known as: celeBREX      Dose: 1 capsule  Take 1 capsule by mouth 2 times daily  Refills: 0     cholecalciferol 25 mcg (1000 units) capsule  Commonly known as: VITAMIN D3      Dose: 2 capsule  Take 2 capsules by mouth daily  Refills: 0     docusate sodium 50 MG capsule  Commonly known as: COLACE      Dose: 50 mg  Take 50 mg by mouth 2 times daily as needed for constipation  Refills: 0     Glucosamine-Chondroitin 750-600 MG Chew      Dose: 1 tablet  Take 1 tablet by mouth 2 times daily.  Refills: 0     melatonin 3 MG tablet      Dose: 3 mg  Take 3 mg by mouth at  bedtime.  Refills: 0     memantine 10 MG tablet  Commonly known as: NAMENDA      Dose: 10 mg  Take 10 mg by mouth 2 times daily.  Refills: 0     * QUEtiapine 25 MG tablet  Commonly known as: SEROquel      Dose: 50 mg  Take 50 mg by mouth at bedtime.  Refills: 0     * QUEtiapine 25 MG tablet  Commonly known as: SEROquel      Dose: 25 mg  Take 25 mg by mouth 2 times daily. Dosing at 8am and 2pm along with bedtime dose at 8pm  Refills: 0     traZODone 50 MG tablet  Commonly known as: DESYREL      Dose: 50 mg  Take 50 mg by mouth at bedtime.  Refills: 0           * This list has 2 medication(s) that are the same as other medications prescribed for you. Read the directions carefully, and ask your doctor or other care provider to review them with you.                   Where to get your medicines        These medications were sent to [facility] [place], [place] - [address]  [address] [address]      Phone: [phone]   cephALEXin 250 MG/5ML suspension       Allergies   No Known Allergies

## 2025-06-20 NOTE — PLAN OF CARE
Goal Outcome Evaluation:       Patient discharged to nursing home at 1255 via [facility].  Accompanied by spouse and staff.  Discharge instructions were reviewed with patient and spouse, opportunity offered to ask questions.    Prescriptions faxed to pharmacy.  Access discontinued: Yes  Care plan and education discontinued: Yes  Home meds retrieved from pharmacy: N/A  Belongings were sent home with patient/family:  Clothing: Shirt(s):  , Pants:  , Underclothes:  , and Outerwear:  .

## 2025-06-20 NOTE — PLAN OF CARE
"PRIMARY DIAGNOSIS: \"GENERIC\" NURSING  OUTPATIENT/OBSERVATION GOALS TO BE MET BEFORE DISCHARGE:  ADLs back to baseline: Yes    Activity and level of assistance: Assist of one    Pain status: Pain free.    Return to near baseline physical activity: Yes     Discharge Planner Nurse   Safe discharge environment identified: Yes  Barriers to discharge: Yes       Entered by: Luci Valdes RN 06/20/2025 10:26 AM     Please review provider order for any additional goals.   Nurse to notify provider when observation goals have been met and patient is ready for discharge.   Goal Outcome Evaluation:                            "

## 2025-06-20 NOTE — PLAN OF CARE
PRIMARY DIAGNOSIS: UTI  OUTPATIENT/OBSERVATION GOALS TO BE MET BEFORE DISCHARGE:  ADLs back to baseline: No    Activity and level of assistance: Per  patient is assist of one and will walk without a walker but needs to be holding onto someone's hand.    Pain status: Patient is non-verbal.  Does not appear to be in any pain    Return to near baseline physical activity: No     Discharge Planner Nurse   Safe discharge environment identified: Yes  Barriers to discharge: Yes       Entered by: Brynn Ramos RN 06/19/2025 7:09 PM    Patient is non-verbal but can follow some commands.   at bedside when patient came to the floor.  Patient comes from memory care.  Is not able to communicate her needs.  She is incontinent of bowel and bladder.  Primafit applied.  Assist of two to reposition in bed.

## 2025-06-20 NOTE — PLAN OF CARE
"PRIMARY DIAGNOSIS: \"GENERIC\" NURSING  OUTPATIENT/OBSERVATION GOALS TO BE MET BEFORE DISCHARGE:  ADLs back to baseline: No    Activity and level of assistance: not out of bed    Pain status: Pain free.    Return to near baseline physical activity: No     Discharge Planner Nurse   Safe discharge environment identified: Yes  Barriers to discharge: Yes       Entered by: Sandra Younger RN 06/19/2025 10:38 PM     Please review provider order for any additional goals.   Nurse to notify provider when observation goals have been met and patient is ready for discharge.Goal Outcome Evaluation:                            "

## 2025-06-20 NOTE — PROGRESS NOTES
Care Management Discharge Note    Discharge Date: 06/20/2025       Discharge Disposition: Assisted Living    Discharge Services:  n/a    Discharge DME:  n /a    Discharge Transportation: health plan transportation (stretcher)    Private pay costs discussed: Not applicable    Education Provided on the Discharge Plan: yes    Persons Notified of Discharge Plans: yes  Patient/Family in Agreement with the Plan: yes    Handoff Referral Completed: No, handoff not indicated or clinically appropriate    Additional Information:  Pt medically stable to return to memory care/Assisted Living Facility; [facility]. YAN confirmed plan with spouse ([facility]) who is requesting transport.  [facility] STR ride set for 1300 with a  window between 1996-1188 (agreeable to private pay and aware of base rate and cost per mile).  YAN spoke with RN at facility (Charleen [phone]) to inform of plan. All parties aware and agreeable to discharge plan. PCS done.    Brenna Kjellberg, BSW  6/20/2025

## 2025-06-20 NOTE — PLAN OF CARE
Problem: UTI (Urinary Tract Infection)  Goal: Improved Infection Symptoms  Outcome: Progressing   Goal Outcome Evaluation:                  Neuro: alert, but non verbal  Cardiac: Afebrile, VSS.   Respiratory: RA  GI/: Voiding spontaneously. No BM this shift.  Diet/appetite: Tolerating pureed diet. Denies nausea.  Activity: not out of bed  Pain: Denies   Skin: No new deficits noted.  Lines: piv SL  Drains: no  Replacements: no

## 2025-06-21 ENCOUNTER — PATIENT OUTREACH (OUTPATIENT)
Dept: CARE COORDINATION | Facility: CLINIC | Age: 70
End: 2025-06-21
Payer: COMMERCIAL

## 2025-06-21 ENCOUNTER — RESULTS FOLLOW-UP (OUTPATIENT)
Dept: FAMILY MEDICINE | Facility: CLINIC | Age: 70
End: 2025-06-21

## 2025-06-21 LAB
ATRIAL RATE - MUSE: 65 BPM
DIASTOLIC BLOOD PRESSURE - MUSE: 56 MMHG
INTERPRETATION ECG - MUSE: NORMAL
P AXIS - MUSE: 58 DEGREES
PR INTERVAL - MUSE: 162 MS
QRS DURATION - MUSE: 94 MS
QT - MUSE: 432 MS
QTC - MUSE: 449 MS
R AXIS - MUSE: -8 DEGREES
SYSTOLIC BLOOD PRESSURE - MUSE: 116 MMHG
T AXIS - MUSE: 38 DEGREES
VENTRICULAR RATE- MUSE: 65 BPM

## 2025-06-21 NOTE — PROGRESS NOTES
Connected Care Resource Center: [facility]    Background: Transitional Care Management program identified per system criteria and reviewed by [facility] Resource [place] team for possible outreach.    Assessment: Upon chart review, [facility] Team member will not proceed with patient outreach related to this episode of Transitional Care Management program due to reason below:    Patient has discharged to a Memory Care, Long-term Care, Assisted Living or Group Home where patient is receiving on-site support with their daily cares, including support with hospital follow up plan.    Plan: Transitional Care Management episode addressed appropriately per reason noted above.      Louann Orourke RN  Connected Care Resource [place], [facility]    *Connected Care Resource Team does NOT follow patient ongoing. Referrals are identified based on internal discharge reports and the outreach is to ensure patient has an understanding of their discharge instructions.

## 2025-06-24 LAB
BACTERIA SPEC CULT: NO GROWTH
BACTERIA SPEC CULT: NO GROWTH

## 2025-07-19 ENCOUNTER — HEALTH MAINTENANCE LETTER (OUTPATIENT)
Age: 70
End: 2025-07-19

## 2025-08-30 ENCOUNTER — HEALTH MAINTENANCE LETTER (OUTPATIENT)
Age: 70
End: 2025-08-30